# Patient Record
Sex: FEMALE | Race: WHITE | Employment: UNEMPLOYED | URBAN - METROPOLITAN AREA
[De-identification: names, ages, dates, MRNs, and addresses within clinical notes are randomized per-mention and may not be internally consistent; named-entity substitution may affect disease eponyms.]

---

## 2017-10-11 ENCOUNTER — HOSPITAL ENCOUNTER (OUTPATIENT)
Dept: LAB | Age: 57
Discharge: HOME | End: 2017-10-11
Payer: OTHER GOVERNMENT

## 2017-10-11 ENCOUNTER — OFFICE VISIT (OUTPATIENT)
Dept: INTERNAL MEDICINE CLINIC | Age: 57
End: 2017-10-11

## 2017-10-11 VITALS
SYSTOLIC BLOOD PRESSURE: 97 MMHG | TEMPERATURE: 97.4 F | HEIGHT: 62 IN | DIASTOLIC BLOOD PRESSURE: 67 MMHG | RESPIRATION RATE: 12 BRPM | BODY MASS INDEX: 25.17 KG/M2 | WEIGHT: 136.8 LBS | HEART RATE: 86 BPM | OXYGEN SATURATION: 95 %

## 2017-10-11 DIAGNOSIS — C50.912 MALIGNANT NEOPLASM OF LEFT FEMALE BREAST, UNSPECIFIED ESTROGEN RECEPTOR STATUS, UNSPECIFIED SITE OF BREAST (HCC): Primary | ICD-10-CM

## 2017-10-11 DIAGNOSIS — E03.9 ACQUIRED HYPOTHYROIDISM: ICD-10-CM

## 2017-10-11 DIAGNOSIS — M79.641 RIGHT HAND PAIN: ICD-10-CM

## 2017-10-11 DIAGNOSIS — Z11.59 NEED FOR HEPATITIS C SCREENING TEST: ICD-10-CM

## 2017-10-11 DIAGNOSIS — M25.551 RIGHT HIP PAIN: ICD-10-CM

## 2017-10-11 DIAGNOSIS — Z13.220 SCREENING FOR HYPERLIPIDEMIA: ICD-10-CM

## 2017-10-11 DIAGNOSIS — Z13.0 SCREENING FOR DEFICIENCY ANEMIA: ICD-10-CM

## 2017-10-11 DIAGNOSIS — C50.912 MALIGNANT NEOPLASM OF LEFT FEMALE BREAST, UNSPECIFIED ESTROGEN RECEPTOR STATUS, UNSPECIFIED SITE OF BREAST (HCC): ICD-10-CM

## 2017-10-11 DIAGNOSIS — Z12.4 ENCOUNTER FOR SCREENING FOR CERVICAL CANCER: ICD-10-CM

## 2017-10-11 PROBLEM — C50.919 MALIGNANT NEOPLASM OF FEMALE BREAST (HCC): Status: ACTIVE | Noted: 2017-10-11

## 2017-10-11 PROBLEM — K90.0 CELIAC DISEASE: Status: ACTIVE | Noted: 2017-10-11

## 2017-10-11 LAB
ALBUMIN SERPL-MCNC: 3.6 G/DL (ref 3.4–5)
ALBUMIN/GLOB SERPL: 1 {RATIO} (ref 0.8–1.7)
ALP SERPL-CCNC: 136 U/L (ref 45–117)
ALT SERPL-CCNC: 44 U/L (ref 13–56)
ANION GAP SERPL CALC-SCNC: 7 MMOL/L (ref 3–18)
AST SERPL-CCNC: 28 U/L (ref 15–37)
BASOPHILS # BLD: 0 K/UL (ref 0–0.1)
BASOPHILS NFR BLD: 1 % (ref 0–2)
BILIRUB SERPL-MCNC: 0.5 MG/DL (ref 0.2–1)
BUN SERPL-MCNC: 21 MG/DL (ref 7–18)
BUN/CREAT SERPL: 20 (ref 12–20)
CALCIUM SERPL-MCNC: 8.5 MG/DL (ref 8.5–10.1)
CHLORIDE SERPL-SCNC: 104 MMOL/L (ref 100–108)
CHOLEST SERPL-MCNC: 159 MG/DL
CO2 SERPL-SCNC: 29 MMOL/L (ref 21–32)
CREAT SERPL-MCNC: 1.07 MG/DL (ref 0.6–1.3)
DIFFERENTIAL METHOD BLD: ABNORMAL
EOSINOPHIL # BLD: 0.1 K/UL (ref 0–0.4)
EOSINOPHIL NFR BLD: 1 % (ref 0–5)
ERYTHROCYTE [DISTWIDTH] IN BLOOD BY AUTOMATED COUNT: 12.3 % (ref 11.6–14.5)
GLOBULIN SER CALC-MCNC: 3.5 G/DL (ref 2–4)
GLUCOSE SERPL-MCNC: 79 MG/DL (ref 74–99)
HCT VFR BLD AUTO: 37.4 % (ref 35–45)
HDLC SERPL-MCNC: 71 MG/DL (ref 40–60)
HDLC SERPL: 2.2 {RATIO} (ref 0–5)
HGB BLD-MCNC: 12.8 G/DL (ref 12–16)
LDLC SERPL CALC-MCNC: 43.8 MG/DL (ref 0–100)
LIPID PROFILE,FLP: ABNORMAL
LYMPHOCYTES # BLD: 1 K/UL (ref 0.9–3.6)
LYMPHOCYTES NFR BLD: 18 % (ref 21–52)
MCH RBC QN AUTO: 33 PG (ref 24–34)
MCHC RBC AUTO-ENTMCNC: 34.2 G/DL (ref 31–37)
MCV RBC AUTO: 96.4 FL (ref 74–97)
MONOCYTES # BLD: 0.3 K/UL (ref 0.05–1.2)
MONOCYTES NFR BLD: 6 % (ref 3–10)
NEUTS SEG # BLD: 4.1 K/UL (ref 1.8–8)
NEUTS SEG NFR BLD: 74 % (ref 40–73)
PLATELET # BLD AUTO: 242 K/UL (ref 135–420)
PMV BLD AUTO: 9.9 FL (ref 9.2–11.8)
POTASSIUM SERPL-SCNC: 4 MMOL/L (ref 3.5–5.5)
PROT SERPL-MCNC: 7.1 G/DL (ref 6.4–8.2)
RBC # BLD AUTO: 3.88 M/UL (ref 4.2–5.3)
SODIUM SERPL-SCNC: 140 MMOL/L (ref 136–145)
T4 FREE SERPL-MCNC: 1.2 NG/DL (ref 0.7–1.5)
TRIGL SERPL-MCNC: 221 MG/DL (ref ?–150)
TSH SERPL DL<=0.05 MIU/L-ACNC: 4.6 UIU/ML (ref 0.36–3.74)
VLDLC SERPL CALC-MCNC: 44.2 MG/DL
WBC # BLD AUTO: 5.6 K/UL (ref 4.6–13.2)

## 2017-10-11 PROCEDURE — 86803 HEPATITIS C AB TEST: CPT | Performed by: INTERNAL MEDICINE

## 2017-10-11 PROCEDURE — 84439 ASSAY OF FREE THYROXINE: CPT | Performed by: INTERNAL MEDICINE

## 2017-10-11 PROCEDURE — 85025 COMPLETE CBC W/AUTO DIFF WBC: CPT | Performed by: INTERNAL MEDICINE

## 2017-10-11 PROCEDURE — 36415 COLL VENOUS BLD VENIPUNCTURE: CPT | Performed by: INTERNAL MEDICINE

## 2017-10-11 PROCEDURE — 80053 COMPREHEN METABOLIC PANEL: CPT | Performed by: INTERNAL MEDICINE

## 2017-10-11 PROCEDURE — 80061 LIPID PANEL: CPT | Performed by: INTERNAL MEDICINE

## 2017-10-11 RX ORDER — LEVOTHYROXINE SODIUM 100 UG/1
100 TABLET ORAL
Qty: 90 TAB | Refills: 3 | Status: SHIPPED | OUTPATIENT
Start: 2017-10-11 | End: 2018-08-31 | Stop reason: SDUPTHER

## 2017-10-11 RX ORDER — GLUCOSAMINE SULFATE 1500 MG
2000 POWDER IN PACKET (EA) ORAL DAILY
COMMUNITY

## 2017-10-11 RX ORDER — GUAIFENESIN AND PHENYLEPHRINE HCL 400; 10 MG/1; MG/1
500 TABLET ORAL DAILY
COMMUNITY
End: 2018-06-05 | Stop reason: ALTCHOICE

## 2017-10-11 RX ORDER — IBUPROFEN 200 MG
800 TABLET ORAL
COMMUNITY
End: 2019-01-24

## 2017-10-11 RX ORDER — CHOLECALCIFEROL (VITAMIN D3) 125 MCG
5000 CAPSULE ORAL DAILY
COMMUNITY

## 2017-10-11 RX ORDER — MAGNESIUM 200 MG
400 TABLET ORAL DAILY
COMMUNITY

## 2017-10-11 RX ORDER — LEVOTHYROXINE SODIUM 100 UG/1
100 TABLET ORAL
COMMUNITY
Start: 2017-07-17 | End: 2017-10-11 | Stop reason: SDUPTHER

## 2017-10-11 RX ORDER — DEXTROMETHORPHAN HYDROBROMIDE, GUAIFENESIN 5; 100 MG/5ML; MG/5ML
650 LIQUID ORAL
COMMUNITY

## 2017-10-11 NOTE — PATIENT INSTRUCTIONS
Patient was given a copy of the Advanced Directive form and understands to bring it in once completed  Health Maintenance Due   Topic Date Due    Hepatitis C Screening  1960    Pneumococcal 19-64 Highest Risk (1 of 3 - PCV13) 12/20/1979    DTaP/Tdap/Td series (1 - Tdap) 12/20/1981    PAP AKA CERVICAL CYTOLOGY  12/20/1981    FOBT Q 1 YEAR AGE 50-75  12/20/2010    BREAST CANCER SCRN MAMMOGRAM  11/21/2014          Hypothyroidism: Care Instructions  Your Care Instructions  You have hypothyroidism, which means that your body is not making enough thyroid hormone. This hormone helps your body use energy. If your thyroid level is low, you may feel tired, be constipated, have an increase in your blood pressure, or have dry skin or memory problems. You may also get cold easily, even when it is warm. Women with low thyroid levels may have heavy menstrual periods. A blood test to find your thyroid-stimulating hormone (TSH) level is used to check for hypothyroidism. A high TSH level may mean that you have low thyroid. When your body is not making enough thyroid hormone, TSH levels rise in an effort to make the body produce more. The treatment for hypothyroidism is to take thyroid hormone pills. You should start to feel better in 1 to 2 weeks. But it can take several months to see changes in the TSH level. You will need regular visits with your doctor to make sure you have the right dose of medicine. Most people need treatment for the rest of their lives. You will need to see your doctor regularly to have blood tests and to make sure you are doing well. Follow-up care is a key part of your treatment and safety. Be sure to make and go to all appointments, and call your doctor if you are having problems. Its also a good idea to know your test results and keep a list of the medicines you take. How can you care for yourself at home? · Take your thyroid hormone medicine exactly as prescribed.  Call your doctor if you think you are having a problem with your medicine. Most people do not have side effects if they take the right amount of medicine regularly. ¨ Take the medicine 30 minutes before breakfast, and do not take it with calcium, vitamins, or iron. ¨ Do not take extra doses of your thyroid medicine. It will not help you get better any faster, and it may cause side effects. ¨ If you forget to take a dose, do NOT take a double dose of medicine. Take your usual dose the next day. · Tell your doctor about all prescription, herbal, or over-the-counter products you take. · Take care of yourself. Eat a healthy diet, get enough sleep, and get regular exercise. When should you call for help? Call 911 anytime you think you may need emergency care. For example, call if:  · You passed out (lost consciousness). · You have severe trouble breathing. · You have a very slow heartbeat (less than 60 beats a minute). · You have a low body temperature (95°F or below). Call your doctor now or seek immediate medical care if:  · You feel tired, sluggish, or weak. · You have trouble remembering things or concentrating. · You do not begin to feel better 2 weeks after starting your medicine. Watch closely for changes in your health, and be sure to contact your doctor if you have any problems. Where can you learn more? Go to http://shamir-majo.info/. Enter K390 in the search box to learn more about \"Hypothyroidism: Care Instructions. \"  Current as of: July 28, 2016  Content Version: 11.3  © 7247-5179 [employer]. Care instructions adapted under license by [email or web address] (which disclaims liability or warranty for this information). If you have questions about a medical condition or this instruction, always ask your healthcare professional. [address] any warranty or liability for your use of this information.

## 2017-10-11 NOTE — PROGRESS NOTES
[facility] OF [facility] [place]:  10/12/2017, MRN: [id number]      Taz Staples is a 64 y.o. female and presents to clinic to [facility]; Hypothyroidism; and Medication Refill (Printed for 90 days 3 refills)    Subjective: The pt is a 77-year-old female with history of left breast adenocarcinoma, celiac disease, and hypothyroidism. 1.  Left breast cancer: Diagnosed in 2012. S/p lumpectomy. S/p XRT. No chemotherapy. She took tamoxifen but had adverse side effects. This was d/c after 3 months. Her last mammogram was about a year ago. She reports no breast masses/pain. No nipple d/c.     2.  Hypothyroidism and Celiac Disease: On Synthroid 100 mcg per day. She was diagnosed with hypothyroidism 20yrs ago. Her Synthroid dose used to be 175mcg daily. It's been 1 year since her labs were checked. She was diagnosed with celiac disease per lab work yrs ago. She tries to adhere to a low carb gluten free diet. When she does not adhere to this diet, she has GI side effects including abdominal discomfort (generalized) and bloating. 3.  Health maintenance:  Last  Mammogram: 10/16 and it was unremarkable per pt hx  Last Pap smear: Overdue. No h/o abnormal PAPs per her hx  No excessive alcohol beverage consumption  - Tobacco use: None  - Drug use: None    4. Right Hip pain: She has \"arthritis\" in her right hip per imaging studies done within the past year. She has pain off/on in both hips but her right hip pain is worse than the left. She is taking tumeric as an antiinflammatory rx for symptomatic relief. Pain has been present for the past year but worse over the past 6 months. She will soak in epsom salt baths for symptomatic relief. 5. Left hand 3rd digit: Her 3rd digit feels \"locked\" in place. She attributes it to having trigger finger. Sx have been present for at least several months. No alleviating factors are known.          Patient Active Problem List    Diagnosis Date Noted    Malignant neoplasm of left female breast ([facility] - ductal adenocarcinoma 10/11/2017    Acquired hypothyroidism 10/11/2017    Celiac disease 10/11/2017       Current Outpatient Prescriptions   Medication Sig Dispense Refill    SYNTHROID 100 mcg tablet Take 1 Tab by mouth Daily (before breakfast). Indications: hypothyroidism 90 Tab 3    acetaminophen (TYLENOL ARTHRITIS PAIN) 650 mg CR tablet Take 650 mg by mouth every six (6) hours as needed for Pain.  ibuprofen (MOTRIN) 200 mg tablet Take 800 mg by mouth every six (6) hours as needed for Pain.  cholecalciferol (VITAMIN D3) 1,000 unit cap Take 1,000 Units by mouth daily.  biotin 5,000 mcg TbDi Take 5,000 mcg by mouth.  B.infantis-B.ani-B.long-B.bifi (PROBIOTIC 4X) 10-15 mg TbEC Take  by mouth daily.  magnesium 200 mg tab Take 200 mg by mouth daily.  turmeric root extract 500 mg cap Take 500 mg by mouth daily.          Allergies   Allergen Reactions    Pcn [Penicillins] Rash    Septra [Sulfamethoprim] Rash       Past Medical History:   Diagnosis Date    Arthritis     right hip    Cancer ([facility] 12/20/2012    Left Breast    Headache     Thyroid disease     hypothyroid       Past Surgical History:   Procedure Laterality Date    HX APPENDECTOMY  2016    HX BREAST LUMPECTOMY  2013 2013    HX CARPAL TUNNEL RELEASE  2014    right    HX CYST REMOVAL  1986    behind left knee    HX TYMPANOSTOMY  1986    bilateral    HX WISDOM TEETH EXTRACTION      x4       Family History   Problem Relation Age of Onset   24 [facility] Cancer Mother      colon,liver   [address] [facility] Hypertension Mother     Heart Disease Father     Heart Attack Father     No Known Problems Brother     No Known Problems Maternal Grandmother     No Known Problems Maternal Grandfather     No Known Problems Paternal Grandmother     No Known Problems Paternal Grandfather     No Known Problems Son     Diabetes Daughter     Thyroid Disease Daughter        Social History   Substance Use Topics    Smoking status: Never Smoker    Smokeless tobacco: Never Used    Alcohol use No       ROS   Review of Systems   Constitutional: Negative for chills and fever. HENT: Negative for ear pain and sore throat. Eyes: Negative for blurred vision and pain. Respiratory: Negative for cough and shortness of breath. Cardiovascular: Negative for chest pain. Gastrointestinal: Negative for abdominal pain, blood in stool and melena. Genitourinary: Negative for dysuria and hematuria. Musculoskeletal: Positive for joint pain. Negative for myalgias. Skin: Negative for rash. Neurological: Negative for tingling, focal weakness and headaches. Endo/Heme/Allergies: Does not bruise/bleed easily. Psychiatric/Behavioral: Negative for substance abuse. Objective     Vitals:    10/11/17 1449   BP: 97/67   Pulse: 86   Resp: 12   Temp: 97.4 °F (36.3 °C)   TempSrc: Oral   SpO2: 95%   Weight: 136 lb 12.8 oz (62.1 kg)   Height: 5' 2\" (1.575 m)   PainSc:   3   PainLoc: Hip       Physical Exam   Constitutional: She is oriented to person, place, and time and well-developed, well-nourished, and in no distress. HENT:   Head: Normocephalic and atraumatic. Right Ear: External ear normal.   Left Ear: External ear normal.   Nose: Nose normal.   Mouth/Throat: Oropharynx is clear and moist. No oropharyngeal exudate. Clear TMs   Eyes: Conjunctivae and EOM are normal. Pupils are equal, round, and reactive to light. Right eye exhibits no discharge. Left eye exhibits no discharge. No scleral icterus. Neck: Neck supple. Cardiovascular: Normal rate, regular rhythm, normal heart sounds and intact distal pulses. Exam reveals no gallop and no friction rub. No murmur heard. Pulmonary/Chest: Effort normal and breath sounds normal. No respiratory distress. She has no wheezes. She has no rales. Abdominal: Soft. Bowel sounds are normal. She exhibits no distension. There is no tenderness. There is no rebound and no guarding.    Musculoskeletal: She exhibits no edema or tenderness (BUE are NTTP but pt has increased muscle tone along her 3rd digit along her left hand). BLE are NTTP; no effusions are present. She has minimal pain with flexion/extension/abduction/adduction of b/l hip jts. Lymphadenopathy:     She has no cervical adenopathy. Neurological: She is alert and oriented to person, place, and time. She exhibits normal muscle tone. Gait normal.   Skin: Skin is warm and dry. No erythema. Psychiatric: Affect normal.   Nursing note and vitals reviewed. Assessment/Plan:   1. Malignant neoplasm of left female breast: S/p lumpectomy. - Checking a CBC, CMP, and TFTs. - Mammogram ordered to screen for breast cancer    ORDERS:  - TSH AND FREE T4; Future  - CBC WITH AUTOMATED DIFF; Future  - METABOLIC PANEL, COMPREHENSIVE; Future  - DEISI MAMMO BI DX INCL CAD; Future    2. Acquired hypothyroidism  Her Synthroid was refilled  Checking TFTs, a CBC, and a CMP. ORDERS:  - TSH AND FREE T4; Future  - CBC WITH AUTOMATED DIFF; Future  - METABOLIC PANEL, COMPREHENSIVE; Future    3. Health Maintenance:  Screening for hyperlipidemia with a lipid panel. Screening for deficiency anemia with a CBC. - Requesting previous physician records and her last vaccination records. Placing a referral for cervical cancer screening  Screening for hepatitis C per our discussion on CDC recommendations based on her date of birth. ORDERS:  - LIPID PANEL; Future  - CBC WITH AUTOMATED DIFF; Future  - REFERRAL TO GYNECOLOGY  - HEPATITIS C AB; Future    4. Left hand pain and Right Hip Pain: Trigger finger and Right hip OA respectively? Placing a referral to the orthopedic surgery team for evaluation  We discussed the importance of modifying her exercise/activities. Activity as tolerated. Can take over-the-counter Tylenol as needed.     ORDERS:  - REFERRAL TO ORTHOPEDIC SURGERY        Health Maintenance Due   Topic Date Due    Pneumococcal 19-64 Highest Risk (1 of 3 - PCV13) 12/20/1979    DTaP/Tdap/Td series (1 - Tdap) 12/20/1981    PAP AKA CERVICAL CYTOLOGY  12/20/1981    FOBT Q 1 YEAR AGE 50-75  12/20/2010    BREAST CANCER SCRN MAMMOGRAM  11/21/2014     Lab review: no labs are present for review. I have discussed the diagnosis with the patient and the intended plan as seen in the above orders. The patient has received an after-visit summary and questions were answered concerning future plans. I have discussed medication side effects and warnings with the patient as well. I have reviewed the plan of care with the patient, accepted their input and they are in agreement with the treatment goals. All questions were answered. The patient understands the plan of care. Handouts provided today with above information. Pt instructed if symptoms worsen to call the office or report to the ED for continued care. Greater than 50% of the visit time was spent in counseling and/or coordination of care. Follow-up Disposition:  Return in about 1 year (around 10/11/2018), or if symptoms worsen or fail to improve.     Daniel Cooley MD

## 2017-10-11 NOTE — ACP (ADVANCE CARE PLANNING)
Patient was given a copy of the Advanced Directive form and understands to bring it in once completed

## 2017-10-11 NOTE — MR AVS SNAPSHOT
Visit Information Date & Time Provider Department Dept. Phone Encounter #  
 10/11/2017  2:30 PM Maggi Edwards MD [facility] [address] [place] [address] Road [facility] [place] [id number] Upcoming Health Maintenance Date Due Hepatitis C Screening 1960 Pneumococcal 19-64 Highest Risk (1 of 3 - PCV13) 12/20/1979 DTaP/Tdap/Td series (1 - Tdap) 12/20/1981 PAP AKA CERVICAL CYTOLOGY 12/20/1981 FOBT Q 1 YEAR AGE 50-75 12/20/2010 BREAST CANCER SCRN MAMMOGRAM 11/21/2014 Allergies as of 10/11/2017  Review Complete On: 10/11/2017 By: Carlos Collins Severity Noted Reaction Type Reactions Pcn [Penicillins] High 10/11/2017    Rash Septra [Sulfamethoprim] High 10/11/2017    Rash Current Immunizations  Never Reviewed No immunizations on file. Not reviewed this visit You Were Diagnosed With   
  
 Codes Comments Malignant neoplasm of left female breast, unspecified estrogen receptor status, unspecified site of breast ([facility]    -  Primary ICD-10-CM: H01.905 ICD-9-CM: 174.9 Acquired hypothyroidism     ICD-10-CM: E03.9 ICD-9-CM: 244.9 Screening for hyperlipidemia     ICD-10-CM: Z13.220 ICD-9-CM: V77.91 Screening for deficiency anemia     ICD-10-CM: Z13.0 ICD-9-CM: V78.1 Right hand pain     ICD-10-CM: M79.641 ICD-9-CM: 729.5 Right hip pain     ICD-10-CM: M25.551 ICD-9-CM: 719.45 Encounter for screening for cervical cancer      ICD-10-CM: Z12.4 ICD-9-CM: V76.2 Need for hepatitis C screening test     ICD-10-CM: Z11.59 
ICD-9-CM: V73.89 Vitals BP Pulse Temp Resp Height(growth percentile) Weight(growth percentile) 97/67 (BP 1 Location: Left arm, BP Patient Position: Sitting) 86 97.4 °F (36.3 °C) (Oral) 12 5' 2\" (1.575 m) 136 lb 12.8 oz (62.1 kg) SpO2 BMI OB Status Smoking Status 95% 25.02 kg/m2 Menopause Never Smoker Vitals History BMI and BSA Data Body Mass Index Body Surface Area 25.02 kg/m 2 1.65 m 2 Your Updated Medication List  
  
   
This list is accurate as of: 10/11/17  3:55 PM.  Always use your most recent med list.  
  
  
  
  
 biotin 5,000 mcg Tbdi Take 5,000 mcg by mouth. ibuprofen 200 mg tablet Commonly known as:  MOTRIN Take 800 mg by mouth every six (6) hours as needed for Pain.  
  
 magnesium 200 mg Tab Take 200 mg by mouth daily. PROBIOTIC 4X 10-15 mg Tbec Generic drug:  B.infantis-B.ani-B.long-B.bifi Take  by mouth daily. SYNTHROID 100 mcg tablet Generic drug:  levothyroxine Take 1 Tab by mouth Daily (before breakfast). Indications: hypothyroidism  
  
 turmeric root extract 500 mg Cap Take 500 mg by mouth daily. TYLENOL ARTHRITIS PAIN 650 mg CR tablet Generic drug:  acetaminophen Take 650 mg by mouth every six (6) hours as needed for Pain. VITAMIN D3 1,000 unit Cap Generic drug:  cholecalciferol Take 1,000 Units by mouth daily. Prescriptions Printed Refills SYNTHROID 100 mcg tablet 3 Sig: Take 1 Tab by mouth Daily (before breakfast). Indications: hypothyroidism Class: Print Route: Oral  
  
We Performed the Following REFERRAL TO GYNECOLOGY [REF30 Custom] REFERRAL TO ORTHOPEDIC SURGERY [REF62 Custom] To-Do List   
 10/11/2017 Lab:  CBC WITH AUTOMATED DIFF   
  
 10/11/2017 Lab:  HEPATITIS C AB   
  
 10/11/2017 Lab:  LIPID PANEL   
  
 10/11/2017 Imaging:  DEISI MAMMO BI DX INCL CAD   
  
 10/11/2017 Lab:  METABOLIC PANEL, COMPREHENSIVE Around 10/11/2017 Lab:  TSH AND FREE T4 Referral Information Referral ID Referred By Referred To  
  
 [id number] Natasha Ramires Not Available Visits Status Start Date End Date 1 New Request 10/11/17 10/11/18 If your referral has a status of pending review or denied, additional information will be sent to support the outcome of this decision. Referral ID Referred By Referred To  
 [id number] Marissa Jones Not Available Visits Status Start Date End Date 1 New Request 10/11/17 10/11/18 If your referral has a status of pending review or denied, additional information will be sent to support the outcome of this decision. Patient Instructions Patient was given a copy of the Advanced Directive form and understands to bring it in once completed Health Maintenance Due Topic Date Due  
 Hepatitis C Screening  1960  Pneumococcal 19-64 Highest Risk (1 of 3 - PCV13) 12/20/1979  
 DTaP/Tdap/Td series (1 - Tdap) 12/20/1981  PAP AKA CERVICAL CYTOLOGY  12/20/1981  
 FOBT Q 1 YEAR AGE 50-75  12/20/2010  BREAST CANCER SCRN MAMMOGRAM  11/21/2014 Hypothyroidism: Care Instructions Your Care Instructions You have hypothyroidism, which means that your body is not making enough thyroid hormone. This hormone helps your body use energy. If your thyroid level is low, you may feel tired, be constipated, have an increase in your blood pressure, or have dry skin or memory problems. You may also get cold easily, even when it is warm. Women with low thyroid levels may have heavy menstrual periods. A blood test to find your thyroid-stimulating hormone (TSH) level is used to check for hypothyroidism. A high TSH level may mean that you have low thyroid. When your body is not making enough thyroid hormone, TSH levels rise in an effort to make the body produce more. The treatment for hypothyroidism is to take thyroid hormone pills. You should start to feel better in 1 to 2 weeks. But it can take several months to see changes in the TSH level. You will need regular visits with your doctor to make sure you have the right dose of medicine. Most people need treatment for the rest of their lives. You will need to see your doctor regularly to have blood tests and to make sure you are doing well. Follow-up care is a key part of your treatment and safety. Be sure to make and go to all appointments, and call your doctor if you are having problems. Its also a good idea to know your test results and keep a list of the medicines you take. How can you care for yourself at home? · Take your thyroid hormone medicine exactly as prescribed. Call your doctor if you think you are having a problem with your medicine. Most people do not have side effects if they take the right amount of medicine regularly. ¨ Take the medicine 30 minutes before breakfast, and do not take it with calcium, vitamins, or iron. ¨ Do not take extra doses of your thyroid medicine. It will not help you get better any faster, and it may cause side effects. ¨ If you forget to take a dose, do NOT take a double dose of medicine. Take your usual dose the next day. · Tell your doctor about all prescription, herbal, or over-the-counter products you take. · Take care of yourself. Eat a healthy diet, get enough sleep, and get regular exercise. When should you call for help? Call 911 anytime you think you may need emergency care. For example, call if: 
· You passed out (lost consciousness). · You have severe trouble breathing. · You have a very slow heartbeat (less than 60 beats a minute). · You have a low body temperature (95°F or below). Call your doctor now or seek immediate medical care if: 
· You feel tired, sluggish, or weak. · You have trouble remembering things or concentrating. · You do not begin to feel better 2 weeks after starting your medicine. Watch closely for changes in your health, and be sure to contact your doctor if you have any problems. Where can you learn more? Go to http://shamir-majo.info/. Enter O896 in the search box to learn more about \"Hypothyroidism: Care Instructions. \" Current as of: July 28, 2016 Content Version: 11.3 © 7616-9568 [employer]. Care instructions adapted under license by [employer] (which disclaims liability or warranty for this information). If you have questions about a medical condition or this instruction, always ask your healthcare professional. [address] any warranty or liability for your use of this information. Introducing [facility]! Sunil Louie introduces zLense patient portal. Now you can access parts of your medical record, email your doctor's office, and request medication refills online. 1. In your internet browser, go to [email or web address] 2. Click on the First Time User? Click Here link in the Sign In box. You will see the New Member Sign Up page. 3. Enter your zLense Access Code exactly as it appears below. You will not need to use this code after youve completed the sign-up process. If you do not sign up before the expiration date, you must request a new code. · zLense Access Code: [email or web address] Expires: 1/9/2018  3:55 PM 
 
4. Enter the last four digits of your Social Security Number (xxxx) and Date of Birth (mm/dd/yyyy) as indicated and click Submit. You will be taken to the next sign-up page. 5. Create a zLense ID. This will be your zLense login ID and cannot be changed, so think of one that is secure and easy to remember. 6. Create a zLense password. You can change your password at any time. 7. Enter your Password Reset Question and Answer. This can be used at a later time if you forget your password. 8. Enter your e-mail address. You will receive e-mail notification when new information is available in [address] 9. Click Sign Up. You can now view and download portions of your medical record. 10. Click the Download Summary menu link to download a portable copy of your medical information.  
 
If you have questions, please visit the Frequently Asked Questions section of the [employer]. Remember, [employer] is NOT to be used for urgent needs. For medical emergencies, dial 911. Now available from your iPhone and Android! Please provide this summary of care documentation to your next provider. Your primary care clinician is listed as Jennifer Baxter. If you have any questions after today's visit, please call [phone].

## 2017-10-12 LAB
HCV AB SER IA-ACNC: 0.09 INDEX
HCV AB SERPL QL IA: NEGATIVE
HCV COMMENT,HCGAC: NORMAL

## 2017-10-17 ENCOUNTER — HOSPITAL ENCOUNTER (OUTPATIENT)
Dept: MAMMOGRAPHY | Age: 57
Discharge: HOME | End: 2017-10-17
Attending: INTERNAL MEDICINE

## 2017-10-17 DIAGNOSIS — C50.912 MALIGNANT NEOPLASM OF LEFT FEMALE BREAST, UNSPECIFIED ESTROGEN RECEPTOR STATUS, UNSPECIFIED SITE OF BREAST (HCC): ICD-10-CM

## 2017-10-21 DIAGNOSIS — E03.9 ACQUIRED HYPOTHYROIDISM: Primary | ICD-10-CM

## 2017-10-22 NOTE — PROGRESS NOTES
Please let the pt know that she does not have hepatitis C infection. Kidney and liver function are normal. Her blood counts show no significant abnormalities. Her 10 year CVD risk per the ACC/AHA risk assessment is 0.8%. No medication is warranted to lower her CVD risk at this time. Meanwhile one of her thyroid labs is abnormal while the other is normal. We need to recheck her thyroid function in 1-2 wks. Please schedule her for a lab visit.      Dr. Samir Barnett  [facility] of [address]  Phone: [phone]  Fax: [phone]

## 2017-10-23 ENCOUNTER — TELEPHONE (OUTPATIENT)
Dept: INTERNAL MEDICINE CLINIC | Age: 57
End: 2017-10-23

## 2017-10-23 NOTE — TELEPHONE ENCOUNTER
----- Message from Foster Pierre MD sent at 10/21/2017  8:07 PM EDT -----  Please let the pt know that she does not have hepatitis C infection. Kidney and liver function are normal. Her blood counts show no significant abnormalities. Her 10 year CVD risk per the ACC/AHA risk assessment is 0.8%. No medication is warranted to lower her CVD risk at this time. Meanwhile one of her thyroid labs is abnormal while the other is normal. We need to recheck her thyroid function in 1-2 wks. Please schedule her for a lab visit.      Dr. Tamika Arango  [facility] [address]  Phone: [phone]  Fax: [phone]

## 2017-10-23 NOTE — TELEPHONE ENCOUNTER
Called and spoke to patient about below message. Patient verbalized understanding.   Scheduled lab appointment for October 30th

## 2017-10-31 ENCOUNTER — HOSPITAL ENCOUNTER (OUTPATIENT)
Dept: LAB | Age: 57
Discharge: HOME | End: 2017-10-31
Payer: OTHER GOVERNMENT

## 2017-10-31 DIAGNOSIS — E03.9 ACQUIRED HYPOTHYROIDISM: ICD-10-CM

## 2017-10-31 LAB
T4 FREE SERPL-MCNC: 1.3 NG/DL (ref 0.7–1.5)
TSH SERPL DL<=0.05 MIU/L-ACNC: 2 UIU/ML (ref 0.36–3.74)

## 2017-10-31 PROCEDURE — 84439 ASSAY OF FREE THYROXINE: CPT | Performed by: INTERNAL MEDICINE

## 2017-10-31 PROCEDURE — 36415 COLL VENOUS BLD VENIPUNCTURE: CPT | Performed by: INTERNAL MEDICINE

## 2017-11-02 ENCOUNTER — OFFICE VISIT (OUTPATIENT)
Dept: ORTHOPEDIC SURGERY | Age: 57
End: 2017-11-02

## 2017-11-02 VITALS
RESPIRATION RATE: 16 BRPM | DIASTOLIC BLOOD PRESSURE: 70 MMHG | OXYGEN SATURATION: 99 % | SYSTOLIC BLOOD PRESSURE: 105 MMHG | HEIGHT: 62 IN | HEART RATE: 81 BPM

## 2017-11-02 DIAGNOSIS — S73.191A TEAR OF RIGHT ACETABULAR LABRUM, INITIAL ENCOUNTER: ICD-10-CM

## 2017-11-02 DIAGNOSIS — M65.331 TRIGGER FINGER, RIGHT MIDDLE FINGER: ICD-10-CM

## 2017-11-02 DIAGNOSIS — M25.551 RIGHT HIP PAIN: ICD-10-CM

## 2017-11-02 DIAGNOSIS — M79.641 RIGHT HAND PAIN: ICD-10-CM

## 2017-11-02 DIAGNOSIS — M16.11 PRIMARY OSTEOARTHRITIS OF RIGHT HIP: Primary | ICD-10-CM

## 2017-11-02 RX ORDER — TRIAMCINOLONE ACETONIDE 40 MG/ML
20 INJECTION, SUSPENSION INTRA-ARTICULAR; INTRAMUSCULAR ONCE
Qty: 0.5 ML | Refills: 0
Start: 2017-11-02 | End: 2017-11-02

## 2017-11-02 RX ORDER — MELOXICAM 15 MG/1
15 TABLET ORAL
Qty: 30 TAB | Refills: 1 | Status: SHIPPED | OUTPATIENT
Start: 2017-11-02 | End: 2018-06-05 | Stop reason: SINTOL

## 2017-11-02 NOTE — PATIENT INSTRUCTIONS
Arthritis: Care Instructions  Your Care Instructions  Arthritis, also called osteoarthritis, is a breakdown of the cartilage that cushions your joints. When the cartilage wears down, your bones rub against each other. This causes pain and stiffness. Many people have some arthritis as they age. Arthritis most often affects the joints of the spine, hands, hips, knees, or feet. You can take simple measures to protect your joints, ease your pain, and help you stay active. Follow-up care is a key part of your treatment and safety. Be sure to make and go to all appointments, and call your doctor if you are having problems. It's also a good idea to know your test results and keep a list of the medicines you take. How can you care for yourself at home? · Stay at a healthy weight. Being overweight puts extra strain on your joints. · Talk to your doctor or physical therapist about exercises that will help ease joint pain. ¨ Stretch. You may enjoy gentle forms of yoga to help keep your joints and muscles flexible. ¨ Walk instead of jog. Other types of exercise that are less stressful on the joints include riding a bicycle, swimming, raul chi, or water exercise. ¨ Lift weights. Strong muscles help reduce stress on your joints. Stronger thigh muscles, for example, take some of the stress off of the knees and hips. Learn the right way to lift weights so you do not make joint pain worse. · Take your medicines exactly as prescribed. Call your doctor if you think you are having a problem with your medicine. · Take pain medicines exactly as directed. ¨ If the doctor gave you a prescription medicine for pain, take it as prescribed. ¨ If you are not taking a prescription pain medicine, ask your doctor if you can take an over-the-counter medicine. · Use a cane, crutch, walker, or another device if you need help to get around. These can help rest your joints.  You also can use other things to make life easier, such as a higher toilet seat and padded handles on kitchen utensils. · Do not sit in low chairs, which can make it hard to get up. · Put heat or cold on your sore joints as needed. Use whichever helps you most. You also can take turns with hot and cold packs. ¨ Apply heat 2 or 3 times a day for 20 to 30 minutes-using a heating pad, hot shower, or hot pack-to relieve pain and stiffness. ¨ Put ice or a cold pack on your sore joint for 10 to 20 minutes at a time. Put a thin cloth between the ice and your skin. When should you call for help? Call your doctor now or seek immediate medical care if:  ? · You have sudden swelling, warmth, or pain in any joint. ? · You have joint pain and a fever or rash. ? · You have such bad pain that you cannot use a joint. ? Watch closely for changes in your health, and be sure to contact your doctor if:  ? · You have mild joint symptoms that continue even with more than 6 weeks of care at home. ? · You have stomach pain or other problems with your medicine. Where can you learn more? Go to http://shamir-majo.info/. Enter O343 in the search box to learn more about \"Arthritis: Care Instructions. \"  Current as of: October 31, 2016  Content Version: 11.4  © 5649-4041 "[employer]". Care instructions adapted under license by [employer] (which disclaims liability or warranty for this information). If you have questions about a medical condition or this instruction, always ask your healthcare professional. [place] [address] any warranty or liability for your use of this information.

## 2017-11-02 NOTE — PROGRESS NOTES
Jennifer Pack  1960   Chief Complaint   Patient presents with    Hand Pain     right    Hip Pain     right        HISTORY OF PRESENT ILLNESS  Jennifer Pack is a 64 y.o. female who presents today for evaluation of right hand and right hip pain. Patient referred by Dr. Amarilys Solano for further evaluation. she rates her pain 8/10 today. Hip pain has been present for at least a year. Night pain when rolling onto the right hip. Describes groin pain, especially with rotation movements. Associated symptoms include locking of the hip and ambulating with a limp. Has tried soaking in Epson salts. Complaining of locking of the right long finger. Has tried following treatments: Injections:NO; Brace:NO; Therapy:NO; Cane/Crutch:NO       Allergies   Allergen Reactions    Pcn [Penicillins] Rash    Septra [Sulfamethoprim] Rash        Past Medical History:   Diagnosis Date    Arthritis     right hip    Cancer ([facility] [address] 12/20/2012    Left Breast    Headache     Thyroid disease     hypothyroid      Social History     Social History    Marital status:      Spouse name: N/A    Number of children: N/A    Years of education: N/A     Occupational History    Not on file.      Social History Main Topics    Smoking status: Never Smoker    Smokeless tobacco: Never Used    Alcohol use No    Drug use: Not on file    Sexual activity: Not on file     Other Topics Concern    Not on file     Social History Narrative      Past Surgical History:   Procedure Laterality Date    HX APPENDECTOMY  2016    HX BREAST LUMPECTOMY  2013 2013    HX CARPAL TUNNEL RELEASE  2014    right    HX CYST REMOVAL  1986    behind left knee    HX TYMPANOSTOMY  1986    bilateral    HX WISDOM TEETH EXTRACTION      x4      Family History   Problem Relation Age of Onset    Cancer Mother      colon,liver    Hypertension Mother     Heart Disease Father     Heart Attack Father     No Known Problems Brother     No Known Problems Maternal Grandmother     No Known Problems Maternal Grandfather     No Known Problems Paternal Grandmother     No Known Problems Paternal Grandfather     No Known Problems Son     Diabetes Daughter     Thyroid Disease Daughter       Current Outpatient Prescriptions   Medication Sig    triamcinolone acetonide (KENALOG) 40 mg/mL injection 0.5 mL by IntraMUSCular route once for 1 dose.  meloxicam (MOBIC) 15 mg tablet Take 1 Tab by mouth daily (with breakfast).  SYNTHROID 100 mcg tablet Take 1 Tab by mouth Daily (before breakfast). Indications: hypothyroidism    acetaminophen (TYLENOL ARTHRITIS PAIN) 650 mg CR tablet Take 650 mg by mouth every six (6) hours as needed for Pain.  ibuprofen (MOTRIN) 200 mg tablet Take 800 mg by mouth every six (6) hours as needed for Pain.  cholecalciferol (VITAMIN D3) 1,000 unit cap Take 1,000 Units by mouth daily.  biotin 5,000 mcg TbDi Take 5,000 mcg by mouth.  B.infantis-B.ani-B.long-B.bifi (PROBIOTIC 4X) 10-15 mg TbEC Take  by mouth daily.  magnesium 200 mg tab Take 200 mg by mouth daily.  turmeric root extract 500 mg cap Take 500 mg by mouth daily. No current facility-administered medications for this visit. REVIEW OF SYSTEM   Patient denies: Weight loss, Fever/Chills, HA, Visual changes, Fatigue, Chest pain, SOB, Abdominal pain, N/V/D/C, Blood in stool or urine, Edema. Pertinent positive as above in HPI. All others were negative    PHYSICAL EXAM:   Visit Vitals    /70    Pulse 81    Resp 16    Ht 5' 2\" (1.575 m)    SpO2 99%     The patient is a well-developed, well-nourished female   in no acute distress. The patient is alert and oriented times three. The patient is alert and oriented times three. Mood and affect are normal.  LYMPHATIC: lymph nodes are not enlarged and are within normal limits  SKIN: normal in color and non tender to palpation. There are no bruises or abrasions noted.    NEUROLOGICAL: Motor sensory exam is within normal limits. Reflexes are equal bilaterally. There is normal sensation to pinprick and light touch  MUSCULOSKELETAL:  Examination Right Hand   Skin Intact   Deformity -   Swelling -   Tenderness -   Tenderness A1 Pulley +long finger   Finger flexion Full   Finger extension Full   Thenar Eminence Atrophy -   Sensation Normal   Capillary refill -   Heberden's nodes -   Dupuytren's -     Examination Right Wrist   Skin Intact   Tenderness -   Flexion 60   Extension 60   Deformity -   Effusion -   Tinnel's sign -   Phalen's test -   Finklestein maneuver -   Pain with thumb abduction -     Examination Right hip   Skin Intact   Flexion ROM 80   Extension ROM 0   External Rotation ROM 30   Internal Rotation ROM 30   Abduction ROM 20   Adduction ROM 20   FADDIR +   RUKHSANA +   Log roll test -   Trochanteric tenderness -   Adia test -   Neurovascular Intact          PROCEDURE: After sterile prep, 0.5 cc of Xylocaine and 0.5 cc of Kenalog were injected into the right A1 pulley of the middle finger.        [facility]  OFFICE PROCEDURE PROGRESS NOTE        Chart reviewed for the following:  Erendira Lorenz MD, have reviewed the History, Physical and updated the Allergic reactions for [place]berg performed immediately prior to start of procedure:  Erendira Lorenz MD, have performed the following reviews on [address] prior to the start of the procedure:            * Patient was identified by name and date of birth   * Agreement on procedure being performed was verified  * Risks and Benefits explained to the patient  * Procedure site verified and marked as necessary  * Patient was positioned for comfort  * Consent was signed and verified     Time: 11:49 AM    Date of procedure: 11/2/2017    Procedure performed by:  Seble Valverde MD    Provider assisted by: (see medication administration)    How tolerated by patient: tolerated the procedure well with no complications    Comments: none      IMAGING:   XR of the right hand dated 11/2/17 was reviewed and read: normal    XR of the right hip dated 11/2/17 was reviewed and read: subchondral cyst of the superior dome of the acetabulum      IMPRESSION:      ICD-10-CM ICD-9-CM    1. Primary osteoarthritis of right hip M16.11 715.15 meloxicam (MOBIC) 15 mg tablet   2. Right hand pain M79.641 729.5 AMB POC XRAY, HAND; 3+ VIEWS   3. Right hip pain M25.551 719.45 AMB POC X-RAY RADEX HIP UNI WITH PELVIS 2-3 VIEWS   4. Tear of right acetabular labrum, initial encounter S73.191A 843.8 XR INJ HIP RT PRE CT/MRI ARTHRO      MRI HIP RT W CONT      meloxicam (MOBIC) 15 mg tablet   5. Trigger finger, right middle finger M65.331 727.03 TRIAMCINOLONE ACETONIDE INJ      triamcinolone acetonide (KENALOG) 40 mg/mL injection      IN DRAIN/INJECT SMALL JOINT/BURSA      meloxicam (MOBIC) 15 mg tablet        PLAN:  1. Patient experiencing right hip pain and triggering of the right middle finger. Risk factors include: n/a  2. Yes cortisone injection indicated today: RIGHT MIDDLE FINGER A1 PULLEY   3. No Physical/Occupational Therapy indicated today  4. Yes diagnostic test indicated today  5. No durable medical equipment indicated today  6. No referral indicated today   7. No medications indicated today  8. No Narcotic indicated today     RTC following completion of the MRI  Office note will be sent to the referring provider. Follow-up Disposition: Not on File    Scribed by Antonino Lee [facility] [address]) as dictated by Mya Peralta MD    I, Dr. Mya Peralta, confirm that all documentation is accurate.     Mya Peralta M.D.   [facility]

## 2017-11-08 NOTE — PROGRESS NOTES
Please let the pt know that her thyroid function is normal per her most recent labs. She should continue taking Synthroid as prescribed.     Dr. Slim Mason  [facility] [address]  Phone: [phone]  Fax: [phone]

## 2017-11-09 ENCOUNTER — TELEPHONE (OUTPATIENT)
Dept: INTERNAL MEDICINE CLINIC | Age: 57
End: 2017-11-09

## 2017-11-09 NOTE — TELEPHONE ENCOUNTER
Called and spoke to patient about the below message. Patient verbalized understanding with no additional questions.

## 2017-11-09 NOTE — TELEPHONE ENCOUNTER
----- Message from Domingo Segura MD sent at 11/8/2017  5:09 PM EST -----  Please let the pt know that her thyroid function is normal per her most recent labs. She should continue taking Synthroid as prescribed.     Dr. Flores Grate  [facility] [place] [address]  Phone: [phone]  Fax: [phone]

## 2017-11-20 ENCOUNTER — DOCUMENTATION ONLY (OUTPATIENT)
Dept: ORTHOPEDIC SURGERY | Age: 57
End: 2017-11-20

## 2017-11-27 ENCOUNTER — TELEPHONE (OUTPATIENT)
Dept: ORTHOPEDIC SURGERY | Age: 57
End: 2017-11-27

## 2017-11-27 NOTE — TELEPHONE ENCOUNTER
Pt requesting that her in office xrays be put on disk as the [facility] where she is having her MRI is requesting them. Please call pt when ready for pickup.

## 2017-12-01 NOTE — ACP (ADVANCE CARE PLANNING)
Advance Care Planning (ACP) Provider Conversation Snapshot    Date of ACP Conversation: 10/11/17  Persons included in Conversation:  patient  Length of ACP Conversation in minutes:  <16 minutes (Non-Billable)    Authorized Decision Maker (if patient is incapable of making informed decisions): This person is:   Healthcare Agent/Medical Power of  under Advance Directive     For Patients with Decision Making Capacity:   Values/Goals: Exploration of values, goals, and preferences if recovery is not expected, even with continued medical treatment in the event of:  Imminent death  Severe, permanent brain injury    Conversation Outcomes / Follow-Up Plan:   Recommended completion of Advance Directive form after review of ACP materials and conversation with prospective healthcare agent   Completed new Advance Directive. The patient completed her advanced directive and return to clinic. She has Harper Saint and Shannon Bettencourt as her power of  and success her power of . Her advanced directive document was scanned into the EHR.       Dr. Portia Alanis  [facility] [place] [address]  Phone: [phone]  Fax: [phone]

## 2017-12-07 ENCOUNTER — OFFICE VISIT (OUTPATIENT)
Dept: ORTHOPEDIC SURGERY | Age: 57
End: 2017-12-07

## 2017-12-07 VITALS — BODY MASS INDEX: 25.03 KG/M2 | HEIGHT: 62 IN | WEIGHT: 136 LBS

## 2017-12-07 DIAGNOSIS — M25.551 RIGHT HIP PAIN: ICD-10-CM

## 2017-12-07 DIAGNOSIS — M16.11 ARTHROPATHY OF RIGHT HIP: Primary | ICD-10-CM

## 2017-12-07 DIAGNOSIS — S73.191A TEAR OF RIGHT ACETABULAR LABRUM, INITIAL ENCOUNTER: ICD-10-CM

## 2017-12-07 RX ORDER — METHYLPREDNISOLONE 4 MG/1
TABLET ORAL
Qty: 21 DOSE PACK | Refills: 0 | Status: SHIPPED | OUTPATIENT
Start: 2017-12-07 | End: 2018-06-05 | Stop reason: ALTCHOICE

## 2017-12-07 RX ORDER — MELOXICAM 15 MG/1
15 TABLET ORAL
Qty: 30 TAB | Refills: 1 | Status: SHIPPED | OUTPATIENT
Start: 2017-12-07 | End: 2018-06-05 | Stop reason: SINTOL

## 2017-12-07 NOTE — PROGRESS NOTES
Nany Gomez  1960   Chief Complaint   Patient presents with    Hip Pain        HISTORY OF PRESENT ILLNESS  Nany Gomez is a 64 y.o. female who presents today for reevaluation of right hand and right hip pain and MRI review. Patient rates pain as 6/10 today. Her hip pain has been present for at least a year. At last OV, she had a cortisone injection in to her right middle finger. She has been taking extra strength tylenol and ibuprofen that have provided little relief. Patient denies any fever, chills, chest pain, shortness of breath or calf pain. There are no new illness or injuries to report since last seen in the office. There are no changes to medications, allergies, family or social history. PHYSICAL EXAM:   Visit Vitals    Ht 5' 2\" (1.575 m)    Wt 136 lb (61.7 kg)    BMI 24.87 kg/m2     The patient is a well-developed, well-nourished female   in no acute distress. The patient is alert and oriented times three. The patient is alert and oriented times three. Mood and affect are normal.  LYMPHATIC: lymph nodes are not enlarged and are within normal limits  SKIN: normal in color and non tender to palpation. There are no bruises or abrasions noted. NEUROLOGICAL: Motor sensory exam is within normal limits. Reflexes are equal bilaterally. There is normal sensation to pinprick and light touch  MUSCULOSKELETAL:        Examination Right hip   Skin Intact   Flexion ROM 80   Extension ROM 0   External Rotation ROM 30   Internal Rotation ROM 30   Abduction ROM 20   Adduction ROM 20   FADDIR +   RUKHSANA +   Log roll test -   Trochanteric tenderness -   Adia test -   Neurovascular Intact       IMAGING:   MRI of the right hip dated 11/30/17 was reviewed and read:   1. Constellation of findings including subchondral cysts without bony proliferation which favors inflammatory arthropathy. A referral to rheumatology for management is recommended if not already performed.   2. Bilateral, asymmetric sacroilitis which supports inflammatory arthropathy as most likely etiology. 3. Multidirectional labral tear. 4. Disc desiccation at multiple levels. XR of the right hand dated 11/2/17 was reviewed and read: normal     XR of the right hip dated 11/2/17 was reviewed and read: subchondral cyst of the superior dome of the acetabulum    IMPRESSION:      ICD-10-CM ICD-9-CM    1. Arthropathy of right hip M16.11 716.85    2. Right hip pain M25.551 719.45 meloxicam (MOBIC) 15 mg tablet      REFERRAL TO RHEUMATOLOGY      methylPREDNISolone (MEDROL DOSEPACK) 4 mg tablet   3. Tear of right acetabular labrum, initial encounter S73.191A 843.8         PLAN:   1. I discussed the results of the MRI and the treatment options with the patient. I feel that this patient has an inflammatory type of arthritis. I am sending her to see a rheumatologist right away. Risk factors include: n/a  2. No cortisone injection indicated today   3. No Physical/Occupational Therapy indicated today  4. No diagnostic test indicated today  5. No durable medical equipment indicated today  6. Yes referral indicated today RHEUMATOLOGY  7. Yes medications indicated today MOBIC, MDP  8. No Narcotic indicated today     RTC PRN  Follow-up Disposition: Not on File    Scribed by Ivory Juares [facility]) as dictated by Walter Mcrgath MD    I, Dr. Walter Mcgrath, confirm that all documentation is accurate.     Walter Mcgrath M.D.   [facility]

## 2017-12-11 ENCOUNTER — TELEPHONE (OUTPATIENT)
Dept: ORTHOPEDIC SURGERY | Age: 57
End: 2017-12-11

## 2017-12-11 NOTE — TELEPHONE ENCOUNTER
Dr. Robert Quintero office called over states that they need all labs, notes pertaining to why pt needs to see Rheumatologist and any images done. Their fax number is [phone].

## 2017-12-21 ENCOUNTER — HOSPITAL ENCOUNTER (OUTPATIENT)
Dept: LAB | Age: 57
Discharge: HOME | End: 2017-12-21
Payer: OTHER GOVERNMENT

## 2017-12-21 LAB
ALBUMIN SERPL-MCNC: 3.7 G/DL (ref 3.4–5)
ALBUMIN/GLOB SERPL: 1.2 {RATIO} (ref 0.8–1.7)
ALP SERPL-CCNC: 104 U/L (ref 45–117)
ALT SERPL-CCNC: 29 U/L (ref 13–56)
ANION GAP SERPL CALC-SCNC: 6 MMOL/L (ref 3–18)
APPEARANCE UR: CLEAR
AST SERPL-CCNC: 22 U/L (ref 15–37)
BACTERIA URNS QL MICRO: NEGATIVE /HPF
BASOPHILS # BLD: 0 K/UL (ref 0–0.1)
BASOPHILS NFR BLD: 0 % (ref 0–2)
BILIRUB SERPL-MCNC: 0.5 MG/DL (ref 0.2–1)
BILIRUB UR QL: NEGATIVE
BUN SERPL-MCNC: 14 MG/DL (ref 7–18)
BUN/CREAT SERPL: 19 (ref 12–20)
CALCIUM SERPL-MCNC: 8.6 MG/DL (ref 8.5–10.1)
CHLORIDE SERPL-SCNC: 105 MMOL/L (ref 100–108)
CK SERPL-CCNC: 100 U/L (ref 26–192)
CO2 SERPL-SCNC: 30 MMOL/L (ref 21–32)
COLOR UR: YELLOW
CREAT SERPL-MCNC: 0.73 MG/DL (ref 0.6–1.3)
CRP SERPL-MCNC: <0.3 MG/DL (ref 0–0.3)
DIFFERENTIAL METHOD BLD: ABNORMAL
EOSINOPHIL # BLD: 0 K/UL (ref 0–0.4)
EOSINOPHIL NFR BLD: 1 % (ref 0–5)
EPITH CASTS URNS QL MICRO: NORMAL /LPF (ref 0–5)
ERYTHROCYTE [DISTWIDTH] IN BLOOD BY AUTOMATED COUNT: 12.8 % (ref 11.6–14.5)
ERYTHROCYTE [SEDIMENTATION RATE] IN BLOOD: 11 MM/HR (ref 0–30)
GLOBULIN SER CALC-MCNC: 3.2 G/DL (ref 2–4)
GLUCOSE SERPL-MCNC: 86 MG/DL (ref 74–99)
GLUCOSE UR STRIP.AUTO-MCNC: NEGATIVE MG/DL
HCT VFR BLD AUTO: 41 % (ref 35–45)
HGB BLD-MCNC: 13.6 G/DL (ref 12–16)
HGB UR QL STRIP: NEGATIVE
KETONES UR QL STRIP.AUTO: NEGATIVE MG/DL
LEUKOCYTE ESTERASE UR QL STRIP.AUTO: ABNORMAL
LYMPHOCYTES # BLD: 0.7 K/UL (ref 0.9–3.6)
LYMPHOCYTES NFR BLD: 12 % (ref 21–52)
MCH RBC QN AUTO: 32.4 PG (ref 24–34)
MCHC RBC AUTO-ENTMCNC: 33.2 G/DL (ref 31–37)
MCV RBC AUTO: 97.6 FL (ref 74–97)
MONOCYTES # BLD: 0.4 K/UL (ref 0.05–1.2)
MONOCYTES NFR BLD: 6 % (ref 3–10)
NEUTS SEG # BLD: 4.9 K/UL (ref 1.8–8)
NEUTS SEG NFR BLD: 81 % (ref 40–73)
NITRITE UR QL STRIP.AUTO: NEGATIVE
PH UR STRIP: 8.5 [PH] (ref 5–8)
PLATELET # BLD AUTO: 236 K/UL (ref 135–420)
PMV BLD AUTO: 9.7 FL (ref 9.2–11.8)
POTASSIUM SERPL-SCNC: 3.9 MMOL/L (ref 3.5–5.5)
PROT SERPL-MCNC: 6.9 G/DL (ref 6.4–8.2)
PROT UR STRIP-MCNC: NEGATIVE MG/DL
RBC # BLD AUTO: 4.2 M/UL (ref 4.2–5.3)
RBC #/AREA URNS HPF: NEGATIVE /HPF (ref 0–5)
SODIUM SERPL-SCNC: 141 MMOL/L (ref 136–145)
SP GR UR REFRACTOMETRY: 1.01 (ref 1–1.03)
TSH SERPL DL<=0.05 MIU/L-ACNC: 1.91 UIU/ML (ref 0.36–3.74)
URATE SERPL-MCNC: 3.1 MG/DL (ref 2.6–7.2)
UROBILINOGEN UR QL STRIP.AUTO: 0.2 EU/DL (ref 0.2–1)
WBC # BLD AUTO: 6 K/UL (ref 4.6–13.2)
WBC URNS QL MICRO: NORMAL /HPF (ref 0–4)

## 2017-12-21 PROCEDURE — 81001 URINALYSIS AUTO W/SCOPE: CPT | Performed by: SPECIALIST

## 2017-12-21 PROCEDURE — 80074 ACUTE HEPATITIS PANEL: CPT | Performed by: SPECIALIST

## 2017-12-21 PROCEDURE — 86235 NUCLEAR ANTIGEN ANTIBODY: CPT | Performed by: SPECIALIST

## 2017-12-21 PROCEDURE — 86140 C-REACTIVE PROTEIN: CPT | Performed by: SPECIALIST

## 2017-12-21 PROCEDURE — 85025 COMPLETE CBC W/AUTO DIFF WBC: CPT | Performed by: SPECIALIST

## 2017-12-21 PROCEDURE — 86480 TB TEST CELL IMMUN MEASURE: CPT | Performed by: SPECIALIST

## 2017-12-21 PROCEDURE — 86200 CCP ANTIBODY: CPT | Performed by: SPECIALIST

## 2017-12-21 PROCEDURE — 83520 IMMUNOASSAY QUANT NOS NONAB: CPT | Performed by: SPECIALIST

## 2017-12-21 PROCEDURE — 36415 COLL VENOUS BLD VENIPUNCTURE: CPT | Performed by: SPECIALIST

## 2017-12-21 PROCEDURE — 85652 RBC SED RATE AUTOMATED: CPT | Performed by: SPECIALIST

## 2017-12-21 PROCEDURE — 82550 ASSAY OF CK (CPK): CPT | Performed by: SPECIALIST

## 2017-12-21 PROCEDURE — 84443 ASSAY THYROID STIM HORMONE: CPT | Performed by: SPECIALIST

## 2017-12-21 PROCEDURE — 80053 COMPREHEN METABOLIC PANEL: CPT | Performed by: SPECIALIST

## 2017-12-21 PROCEDURE — 84550 ASSAY OF BLOOD/URIC ACID: CPT | Performed by: SPECIALIST

## 2017-12-21 PROCEDURE — 86038 ANTINUCLEAR ANTIBODIES: CPT | Performed by: SPECIALIST

## 2017-12-22 LAB
ANA TITR SER IF: NEGATIVE {TITER}
CCP IGA+IGG SERPL IA-ACNC: 2 UNITS (ref 0–19)
ENA RNP AB SER-ACNC: 0.2 AI (ref 0–0.9)
ENA SM AB SER-ACNC: <0.2 AI (ref 0–0.9)
HAV IGM SER QL: NEGATIVE
HBV CORE IGM SER QL: NEGATIVE
HBV SURFACE AG SER QL: <0.1 INDEX
HBV SURFACE AG SER QL: NEGATIVE
HCV AB SER IA-ACNC: 0.02 INDEX
HCV AB SERPL QL IA: NEGATIVE
HCV COMMENT,HCGAC: NORMAL
SP1: NORMAL
SP2: NORMAL
SP3: NORMAL

## 2017-12-26 LAB
ANNOTATION COMMENT IMP: NORMAL
C-ANCA TITR SER IF: NORMAL TITER
M TB IFN-G CD4+ BCKGRND COR BLD-ACNC: 0 IU/ML
M TB IFN-G CD4+ T-CELLS BLD-ACNC: 0.06 IU/ML
M TB TUBERC IFN-G BLD QL: NEGATIVE
M TB TUBERC IGNF/MITOGEN IGNF CONTROL: >10 IU/ML
MYELOPEROXIDASE AB SER IA-ACNC: <9 U/ML (ref 0–9)
P-ANCA ATYPICAL TITR SER IF: NORMAL TITER
P-ANCA TITR SER IF: NORMAL TITER
PROTEINASE3 AB SER IA-ACNC: <3.5 U/ML (ref 0–3.5)
QUANTIFERON NIL VALUE: 0.06 IU/ML
SERVICE CMNT-IMP: NORMAL

## 2018-01-24 ENCOUNTER — OFFICE VISIT (OUTPATIENT)
Dept: ORTHOPEDIC SURGERY | Age: 58
End: 2018-01-24

## 2018-01-24 VITALS
SYSTOLIC BLOOD PRESSURE: 104 MMHG | OXYGEN SATURATION: 100 % | WEIGHT: 137 LBS | TEMPERATURE: 96.8 F | DIASTOLIC BLOOD PRESSURE: 65 MMHG | BODY MASS INDEX: 25.21 KG/M2 | HEART RATE: 75 BPM | HEIGHT: 62 IN

## 2018-01-24 DIAGNOSIS — S73.191A TEAR OF RIGHT ACETABULAR LABRUM, INITIAL ENCOUNTER: ICD-10-CM

## 2018-01-24 DIAGNOSIS — M16.11 PRIMARY OSTEOARTHRITIS OF RIGHT HIP: ICD-10-CM

## 2018-01-24 DIAGNOSIS — M16.11 ARTHROPATHY OF RIGHT HIP: Primary | ICD-10-CM

## 2018-01-24 NOTE — PROGRESS NOTES
Konstantin Rocah  1960   Chief Complaint   Patient presents with    Follow-up     right hip        HISTORY OF PRESENT ILLNESS  Konstantin Rocha is a 62 y.o. female who presents today for reevaluation of right hand and right hip pain and MRI review. Patient rates pain as 0/10 today. Patient is pleased with her decreased pain while taking Mobic. She continues to have occasional pain with prolonged walking and standing. In general, it does not affect her ADLs. She went to rheumatology and it all tests came back negative. Rheumatology gave her Mobic 7.5 which she takes every other day. Patient denies any fever, chills, chest pain, shortness of breath or calf pain. There are no new illness or injuries to report since last seen in the office. There are no changes to medications, allergies, family or social history. PHYSICAL EXAM:   Visit Vitals    /65    Pulse 75    Temp 96.8 °F (36 °C) (Oral)    Ht 5' 2\" (1.575 m)    Wt 137 lb (62.1 kg)    SpO2 100%    BMI 25.06 kg/m2     The patient is a well-developed, well-nourished female   in no acute distress. The patient is alert and oriented times three. The patient is alert and oriented times three. Mood and affect are normal.  LYMPHATIC: lymph nodes are not enlarged and are within normal limits  SKIN: normal in color and non tender to palpation. There are no bruises or abrasions noted. NEUROLOGICAL: Motor sensory exam is within normal limits. Reflexes are equal bilaterally. There is normal sensation to pinprick and light touch  MUSCULOSKELETAL:   Examination Right hip   Skin Intact   Flexion ROM 80   Extension ROM 0   External Rotation ROM 30   Internal Rotation ROM 30   Abduction ROM 20   Adduction ROM 20   FADDIR +   RUKHSANA +   Log roll test -   Trochanteric tenderness -   Adia test -   Neurovascular Intact       IMAGING:   MRI of the right hip dated 11/30/17 was reviewed and read:   1.  Constellation of findings including subchondral cysts without bony proliferation which favors inflammatory arthropathy. A referral to rheumatology for management is recommended if not already performed. 2. Bilateral, asymmetric sacroilitis which supports inflammatory arthropathy as most likely etiology. 3. Multidirectional labral tear. 4. Disc desiccation at multiple levels. XR of the right hand dated 11/2/17 was reviewed and read: normal     XR of the right hip dated 11/2/17 was reviewed and read: subchondral cyst of the superior dome of the acetabulum    IMPRESSION:      ICD-10-CM ICD-9-CM    1. Arthropathy of right hip M16.11 716.85    2. Tear of right acetabular labrum, initial encounter S73.191A 843.8    3. Primary osteoarthritis of right hip M16.11 715.15         PLAN:   1. The patient's right hip pain has improved with the use of Mobic. I am hopeful that we can continue to manage her symptoms this way. Discussed future hip replacement. Risk factors include: n/a  2. No cortisone injection indicated today   3. No Physical/Occupational Therapy indicated today  4. No diagnostic test indicated today  5. No durable medical equipment indicated today  6. No referral indicated today   7. No medications indicated today   8.  No Narcotic indicated today     RTC prn  Follow-up Disposition: Not on File    Scribed by Per [address]) as dictated by ZACHERY [facility]on [address] [facility]ist

## 2018-02-12 ENCOUNTER — TELEPHONE (OUTPATIENT)
Dept: ORTHOPEDIC SURGERY | Age: 58
End: 2018-02-12

## 2018-02-12 NOTE — TELEPHONE ENCOUNTER
Pt requesting a copy of her xray done last month at [facility]. Please call pt when it's ready.   Pt F#[id number]

## 2018-02-12 NOTE — TELEPHONE ENCOUNTER
Patient is calling to check the status of this request. Patient needs for an appt for tomorrow.   Patient can be reached at [phone]

## 2018-02-13 NOTE — TELEPHONE ENCOUNTER
PATIENT CALLED AGAIN AND SAID SHE NEEDS A COPY OF THE XRAY THAT WAS TAKEN ON HER RT HIP BY Jack Alvarez NURSE LAST MONTH IN January. PATIENT SAID SHE NEEDS IT AS SOON AS POSSIBLE TODAY DUE TO SHE HAS AN APPOINTMENT TO GO TO AND IS REQUIRED TO TAKE THE COPY OF THE XRAY. PATIENT SAID SHE NEEDS TO  THE COPY OF THE XRAY ON [facility] [address] TODAY IN THE NEXT 15 MINS. PATIENT WILL  AT [address] [place] [address].. PATIENT TEL. [phone].

## 2018-02-13 NOTE — TELEPHONE ENCOUNTER
Just spoke to pt. She is going to [facility] office to  x-ray results. Pt is requesting a disc but was not contacted to come pick it up yet. I told her the results will be in the office note if the disc is not ready. Pt does not have release form on file.

## 2018-03-09 ENCOUNTER — TELEPHONE (OUTPATIENT)
Dept: INTERNAL MEDICINE CLINIC | Age: 58
End: 2018-03-09

## 2018-03-09 NOTE — TELEPHONE ENCOUNTER
Please call patient and let her know we have made 3 requests to [facility] to retrieve her last two years of records to no avail. We need her immunization history, last pap, last office notes, and colonoscopy report (if she has had one). She may have access to her records through a MY CHART type account. If not, perhaps she can request written copies of these to give to Dr Christiane Rhoades.

## 2018-03-12 NOTE — TELEPHONE ENCOUNTER
Pt to try and get records from [facility]. She says we [facility]. I called them and they are faxing last note today.

## 2018-05-31 ENCOUNTER — TELEPHONE (OUTPATIENT)
Dept: INTERNAL MEDICINE CLINIC | Age: 58
End: 2018-05-31

## 2018-05-31 DIAGNOSIS — B02.9 HERPES ZOSTER WITHOUT COMPLICATION: Primary | ICD-10-CM

## 2018-05-31 RX ORDER — VALACYCLOVIR HYDROCHLORIDE 1 G/1
1000 TABLET, FILM COATED ORAL 3 TIMES DAILY
Qty: 21 TAB | Refills: 0 | Status: SHIPPED | OUTPATIENT
Start: 2018-05-31 | End: 2018-06-07

## 2018-05-31 NOTE — TELEPHONE ENCOUNTER
Pt calling asking to speak to Dr. Alexander Minaya or nurse. Says she got her second case of shingles. Wants to know how contagious she is? If she is in same room as grandchild who is 5 months old can the baby get the chickenpox?

## 2018-05-31 NOTE — TELEPHONE ENCOUNTER
Chief Complaint   Patient presents with    Illness     re: Shingles     Patient reached and informed per Dr Kilo Perez she is contagious and should not be around grandchildren. The patient already has a follow up appointment with Dr Kilo Perez next week, and states she started the antibiotic Tuesday a few days ago.

## 2018-06-05 ENCOUNTER — OFFICE VISIT (OUTPATIENT)
Dept: INTERNAL MEDICINE CLINIC | Age: 58
End: 2018-06-05

## 2018-06-05 VITALS
WEIGHT: 136.2 LBS | DIASTOLIC BLOOD PRESSURE: 68 MMHG | HEIGHT: 62 IN | HEART RATE: 71 BPM | RESPIRATION RATE: 12 BRPM | OXYGEN SATURATION: 96 % | TEMPERATURE: 97.8 F | SYSTOLIC BLOOD PRESSURE: 101 MMHG | BODY MASS INDEX: 25.06 KG/M2

## 2018-06-05 DIAGNOSIS — K90.0 CELIAC DISEASE: ICD-10-CM

## 2018-06-05 DIAGNOSIS — Z13.220 SCREENING FOR HYPERLIPIDEMIA: ICD-10-CM

## 2018-06-05 DIAGNOSIS — C50.912 MALIGNANT NEOPLASM OF LEFT FEMALE BREAST, UNSPECIFIED ESTROGEN RECEPTOR STATUS, UNSPECIFIED SITE OF BREAST (HCC): ICD-10-CM

## 2018-06-05 DIAGNOSIS — E03.9 ACQUIRED HYPOTHYROIDISM: ICD-10-CM

## 2018-06-05 DIAGNOSIS — B02.9 HERPES ZOSTER WITHOUT COMPLICATION: Primary | ICD-10-CM

## 2018-06-05 RX ORDER — HYDROXYZINE 25 MG/1
TABLET, FILM COATED ORAL
COMMUNITY
Start: 2018-05-29 | End: 2018-06-05

## 2018-06-05 RX ORDER — CYANOCOBALAMIN (VITAMIN B-12) 1000 MCG
200 TABLET, EXTENDED RELEASE ORAL DAILY
COMMUNITY
End: 2018-08-31 | Stop reason: ALTCHOICE

## 2018-06-05 NOTE — PROGRESS NOTES
Chief Complaint   Patient presents with   [address] on 5-29-18 for Shingles     1. Have you been to the ER, urgent care clinic since your last visit? Hospitalized since your last visit? Yes When: 5-29-18 Where: [facility] Reason: Shingles    2. Have you seen or consulted any other health care providers outside of the [facility] since your last visit? Include any pap smears or colon screening.  No

## 2018-06-05 NOTE — MR AVS SNAPSHOT
[address] [facility], [place] 
 
 
 [address] [facility], [address] [place] [address] [facility] 
[phone] Patient: Katy Calixto 
MRN: [id number] Visit Information Date & Time Provider Department Dept. Phone Encounter #  
 6/5/2018  9:00 AM Anna Strickland MD [id number] Follow-up Instructions Return in about 6 months (around 12/5/2018) for hypothyroidism, celiac disease, flu vaccine, pneumococcal vaccine. Your Appointments 12/5/2018 10:00 AM  
Office Visit with Anna Strickland MD  
[facility] [facility]) Appt Note: ov 6mos. TELECARE [facility] [address], [address] [place] [address] [address] [facility]  
  
   
 [address], [address] Upcoming Health Maintenance Date Due Pneumococcal 19-64 Highest Risk (1 of 3 - PCV13) 12/20/1979 DTaP/Tdap/Td series (1 - Tdap) 12/20/1981 FOBT Q 1 YEAR AGE 50-75 12/20/2010 Influenza Age 5 to Adult 8/1/2018 BREAST CANCER SCRN MAMMOGRAM 1/30/2020 PAP AKA CERVICAL CYTOLOGY 11/14/2020 Allergies as of 6/5/2018  Review Complete On: 6/5/2018 By: Anna Strickland MD  
  
 Severity Noted Reaction Type Reactions Mobic [Meloxicam] High 06/05/2018    Other (comments) Bloating and severe constipation Pcn [Penicillins] High 10/11/2017    Rash Penicillin G High   Rash Septra [Sulfamethoprim] High 10/11/2017    Rash  
 Sulfa (Sulfonamide Antibiotics) High   Other (comments) Current Immunizations  Never Reviewed No immunizations on file. Not reviewed this visit You Were Diagnosed With   
  
 Codes Comments Celiac disease    -  Primary ICD-10-CM: K90.0 ICD-9-CM: 579.0 Malignant neoplasm of left female breast, unspecified estrogen receptor status, unspecified site of [facility]     ICD-10-CM: W29.934 ICD-9-CM: 174.9 Acquired hypothyroidism     ICD-10-CM: E03.9 ICD-9-CM: 244.9 Herpes zoster without complication     YYE-33-BY: B02.9 ICD-9-CM: 479. 9 Vitals BP Pulse Temp Resp Height(growth percentile) Weight(growth percentile) 101/68 (BP 1 Location: Left arm, BP Patient Position: Sitting) 71 97.8 °F (36.6 °C) (Oral) 12 5' 2\" (1.575 m) 136 lb 3.2 oz (61.8 kg) SpO2 BMI OB Status Smoking Status 96% 24.91 kg/m2 Menopause Never Smoker BMI and BSA Data Body Mass Index Body Surface Area 24.91 kg/m 2 1.64 m 2 Preferred Pharmacy Pharmacy Name Phone [address] [facility] [address], [facility] [address] [facility] [address] [phone] Your Updated Medication List  
  
   
This list is accurate as of 6/5/18  9:43 AM.  Always use your most recent med list.  
  
  
  
  
 biotin 5,000 mcg Tbdi Take 5,000 mcg by mouth daily. ibuprofen 200 mg tablet Commonly known as:  MOTRIN Take 800 mg by mouth every six (6) hours as needed for Pain.  
  
 magnesium 200 mg Tab Take 400 mg by mouth daily. potassium 99 mg tablet Take 99 mg by mouth daily. PROBIOTIC 4X 10-15 mg Tbec Generic drug:  B.infantis-B.ani-B.long-B.bifi Take  by mouth daily. selenium 200 mcg Cap Take 200 mg by mouth daily. SYNTHROID 100 mcg tablet Generic drug:  levothyroxine Take 1 Tab by mouth Daily (before breakfast). Indications: hypothyroidism TYLENOL ARTHRITIS PAIN 650 mg Randolph William Generic drug:  acetaminophen Take 650 mg by mouth every six (6) hours as needed for Pain. valACYclovir 1 gram tablet Commonly known as:  VALTREX Take 1 Tab by mouth three (3) times daily for 7 days. VITAMIN D3 1,000 unit Cap Generic drug:  cholecalciferol Take 1,000 Units by mouth daily. Follow-up Instructions Return in about 6 months (around 12/5/2018) for hypothyroidism, celiac disease, flu vaccine, pneumococcal vaccine. Patient Instructions Health Maintenance Due Topic Date Due  
  Pneumococcal 19-64 Highest Risk (1 of 3 - PCV13) 12/20/1979  
 DTaP/Tdap/Td series (1 - Tdap) 12/20/1981  
 FOBT Q 1 YEAR AGE 50-75  12/20/2010 Shingles: Care Instructions Your Care Instructions Shingles (herpes zoster) causes pain and a blistered rash. The rash can appear anywhere on the body but will be on only one side of the body, the left or right. It will be in a band, a strip, or a small area. The pain can be very severe. Shingles can also cause tingling or itching in the area of the rash. The blisters scab over after a few days and heal in 2 to 4 weeks. Medicines can help you feel better and may help prevent more serious problems caused by shingles. Shingles is caused by the same virus that causes chickenpox. When you have chickenpox, the virus gets into your nerve roots and stays there (becomes dormant) long after you get over the chickenpox. If the virus becomes active again, it can cause shingles. Follow-up care is a key part of your treatment and safety. Be sure to make and go to all appointments, and call your doctor if you are having problems. It's also a good idea to know your test results and keep a list of the medicines you take. How can you care for yourself at home? · Be safe with medicines. Take your medicines exactly as prescribed. Call your doctor if you think you are having a problem with your medicine. Antiviral medicine helps you get better faster. · Try not to scratch or pick at the blisters. They will crust over and fall off on their own if you leave them alone. · Put cool, wet cloths on the area to relieve pain and itching. You can also use calamine lotion. Try not to use so much lotion that it cakes and is hard to get off. · Put cornstarch or baking soda on the sores to help dry them out so they heal faster. · Do not use thick ointment, such as petroleum jelly, on the sores. This will keep them from drying and healing. · To help remove loose crusts, soak them in tap water. This can help decrease oozing, and dry and soothe the skin. · Take an over-the-counter pain medicine, such as acetaminophen (Tylenol), ibuprofen (Advil, Motrin), or naproxen (Aleve). Read and follow all instructions on the label. · Avoid close contact with people until the blisters have healed. It is very important for you to avoid contact with anyone who has never had chickenpox or the chickenpox vaccine. Pregnant women, young babies, and anyone else who has a hard time fighting infection (such as someone with HIV, diabetes, or cancer) is especially at risk. When should you call for help? Call your doctor now or seek immediate medical care if: 
? · You have a new or higher fever. ? · You have a severe headache and a stiff neck. ? · You lose the ability to think clearly. ? · The rash spreads to your forehead, nose, eyes, or eyelids. ? · You have eye pain, or your vision gets worse. ? · You have new pain in your face, or you cannot move the muscles in your face. ? · Blisters spread to new parts of your body. ? Watch closely for changes in your health, and be sure to contact your doctor if: 
? · The rash has not healed after 2 to 4 weeks. ? · You still have pain after the rash has healed. Where can you learn more? Go to http://shamir-majo.info/. Juan Manuel Guido in the search box to learn more about \"Shingles: Care Instructions. \" Current as of: March 3, 2017 Content Version: 11.4 © 9622-3146 [employer]. Care instructions adapted under license by [employer] (which disclaims liability or warranty for this information). If you have questions about a medical condition or this instruction, always ask your healthcare professional. [address] any warranty or liability for your use of this information. Introducing [facility]! Yulisa Hernandez introduces [employer] patient portal. Now you can access parts of your medical record, email your doctor's office, and request medication refills online. 1. In your internet browser, go to [email or web address] 2. Click on the First Time User? Click Here link in the Sign In box. You will see the New Member Sign Up page. 3. Enter your [employer] Access Code exactly as it appears below. You will not need to use this code after youve completed the sign-up process. If you do not sign up before the expiration date, you must request a new code. · [employer] Access Code: [email or web address] Expires: 9/3/2018  8:58 AM 
 
4. Enter the last four digits of your Social Security Number (xxxx) and Date of Birth (mm/dd/yyyy) as indicated and click Submit. You will be taken to the next sign-up page. 5. Create a Optinuity ID. This will be your [employer]inuity login ID and cannot be changed, so think of one that is secure and easy to remember. 6. Create a [employer] password. You can change your password at any time. 7. Enter your Password Reset Question and Answer. This can be used at a later time if you forget your password. 8. Enter your e-mail address. You will receive e-mail notification when new information is available in [address] 9. Click Sign Up. You can now view and download portions of your medical record. 10. Click the Download Summary menu link to download a portable copy of your medical information. If you have questions, please visit the Frequently Asked Questions section of the [employer] website. Remember, [employer] is NOT to be used for urgent needs. For medical emergencies, dial 911. Now available from your iPhone and Android! Please provide this summary of care documentation to your next provider. Your primary care clinician is listed as Lucas Gonzales. If you have any questions after today's visit, please call [phone].

## 2018-06-05 NOTE — PROGRESS NOTES
[facility]:  6/5/2018, MRN: [id number]      Henok Ratliff is a 62 y.o. female and presents to clinic for Hospital Follow Up [facility] on 5-29-18 for Shingles)    Subjective: The pt is a 68-year-old female with history of left breast adenocarcinoma (s/p lumpectomy and XRT in 2012), celiac disease, and hypothyroidism. 1. Shingles: Present since 5/29/18 along her left inner thigh. She was seen in the ED and given a rx for valtrex. She has no adverse side effects from the valtrex. She has no pain along the affects area today. The area initially blistered up but is now it is \"crusting up\" per her hx. The rash is associated with pruritus. No fever/chills. 2. Left Breast Cancer: S/p lumpectomy. Diagnosed in 2012. S/p XRT. She did not receive chemotherapy. She was unable to tolerate tamoxifen. No breast pain/masses. [facility]. 3. Hypothyroidism: On Synthroid 100mcg daily. She has had hypothyroidism >20 yrs. 4. Health Maintenance:  - She had a colonoscopy 5 yrs ago. No hematochezia or melena. Patient Active Problem List    Diagnosis Date Noted    Malignant neoplasm of left female breast ([facility] - ductal adenocarcinoma 10/11/2017    Acquired hypothyroidism 10/11/2017    Celiac disease 10/11/2017       Current Outpatient Prescriptions   Medication Sig Dispense Refill    selenium 200 mcg cap Take 200 mg by mouth daily.  potassium 99 mg tablet Take 99 mg by mouth daily.  valACYclovir (VALTREX) 1 gram tablet Take 1 Tab by mouth three (3) times daily for 7 days. 21 Tab 0    SYNTHROID 100 mcg tablet Take 1 Tab by mouth Daily (before breakfast). Indications: hypothyroidism 90 Tab 3    acetaminophen (TYLENOL ARTHRITIS PAIN) 650 mg CR tablet Take 650 mg by mouth every six (6) hours as needed for Pain.  ibuprofen (MOTRIN) 200 mg tablet Take 800 mg by mouth every six (6) hours as needed for Pain.       cholecalciferol (VITAMIN D3) 1,000 unit cap Take 1,000 Units by mouth daily.  biotin 5,000 mcg TbDi Take 5,000 mcg by mouth daily.  magnesium 200 mg tab Take 400 mg by mouth daily.  B.infantis-B.ani-B.long-B.bifi (PROBIOTIC 4X) 10-15 mg TbEC Take  by mouth daily. Allergies   Allergen Reactions    Mobic [Meloxicam] Other (comments)     Bloating and severe constipation    Pcn [Penicillins] Rash    Penicillin G Rash    Septra [Sulfamethoprim] Rash    Sulfa (Sulfonamide Antibiotics) Other (comments)       Past Medical History:   Diagnosis Date    Arthritis     right hip    Cancer ([facility] 12/20/2012    Left Breast    Headache     Thyroid disease     hypothyroid       Past Surgical History:   Procedure Laterality Date    HX APPENDECTOMY  2016    HX BREAST LUMPECTOMY  2013 2013    HX CARPAL TUNNEL RELEASE  2014    right    HX CYST REMOVAL  1986    behind left knee    HX TYMPANOSTOMY  1986    bilateral    HX WISDOM TEETH EXTRACTION      x4       Family History   Problem Relation Age of Onset    Cancer Mother      colon,liver    Hypertension Mother     Heart Disease Father     Heart Attack Father     No Known Problems Brother     No Known Problems Maternal Grandmother     No Known Problems Maternal Grandfather     No Known Problems Paternal Grandmother     No Known Problems Paternal Grandfather     No Known Problems Son     Diabetes Daughter     Thyroid Disease Daughter        Social History   Substance Use Topics    Smoking status: Never Smoker    Smokeless tobacco: Never Used    Alcohol use No       ROS   Review of Systems   Constitutional: Negative for chills and fever. HENT: Negative for ear pain and sore throat. Eyes: Negative for blurred vision and pain. Respiratory: Negative for cough and shortness of breath. Cardiovascular: Negative for chest pain. Gastrointestinal: Negative for abdominal pain, blood in stool and melena. Genitourinary: Negative for dysuria and hematuria.    Musculoskeletal: Positive for joint pain (off/on). Negative for myalgias. Skin: Positive for itching and rash. Neurological: Negative for tingling, focal weakness and headaches. Endo/Heme/Allergies: Does not bruise/bleed easily. Psychiatric/Behavioral: Negative for substance abuse. Objective     Vitals:    06/05/18 0914   BP: 101/68   Pulse: 71   Resp: 12   Temp: 97.8 °F (36.6 °C)   TempSrc: Oral   SpO2: 96%   Weight: 136 lb 3.2 oz (61.8 kg)   Height: 5' 2\" (1.575 m)   PainSc:   0 - No pain       Physical Exam   Constitutional: She is oriented to person, place, and time and well-developed, well-nourished, and in no distress. HENT:   Head: Normocephalic and atraumatic. Right Ear: External ear normal.   Left Ear: External ear normal.   Nose: Nose normal.   Mouth/Throat: Oropharynx is clear and moist. No oropharyngeal exudate. Eyes: Conjunctivae and EOM are normal. Pupils are equal, round, and reactive to light. Right eye exhibits no discharge. Left eye exhibits no discharge. No scleral icterus. Neck: Neck supple. Cardiovascular: Normal rate, regular rhythm, normal heart sounds and intact distal pulses. Exam reveals no gallop and no friction rub. No murmur heard. Pulmonary/Chest: Effort normal and breath sounds normal. No respiratory distress. She has no wheezes. She has no rales. Abdominal: Soft. Bowel sounds are normal. She exhibits no distension. There is no tenderness. There is no rebound and no guarding. Musculoskeletal: She exhibits no edema or tenderness (BUE). Lymphadenopathy:     She has no cervical adenopathy. Neurological: She is alert and oriented to person, place, and time. She exhibits normal muscle tone. Gait normal.   Skin: Skin is warm and dry. There is stage II ulcer along her left inner thigh with minimal surrounding erythema, which is nontender to palpation.   She has a satellite lesion more proximal that is vesicular in appearance in the size of less than a pencil eraser; there is no surrounding erythema along his lesion. There is a third lesion on her bilateral anterior left thigh that is vesicular in appearance. There is no surrounding erythema along this lesion. Psychiatric: Affect normal.   Nursing note and vitals reviewed. LABS   Data Review:   Lab Results   Component Value Date/Time    WBC 6.0 12/21/2017 12:45 PM    HGB 13.6 12/21/2017 12:45 PM    HCT 41.0 12/21/2017 12:45 PM    PLATELET 110 36/14/7654 12:45 PM    MCV 97.6 (H) 12/21/2017 12:45 PM       Lab Results   Component Value Date/Time    Sodium 141 12/21/2017 12:45 PM    Potassium 3.9 12/21/2017 12:45 PM    Chloride 105 12/21/2017 12:45 PM    CO2 30 12/21/2017 12:45 PM    Anion gap 6 12/21/2017 12:45 PM    Glucose 86 12/21/2017 12:45 PM    BUN 14 12/21/2017 12:45 PM    Creatinine 0.73 12/21/2017 12:45 PM    BUN/Creatinine ratio 19 12/21/2017 12:45 PM    GFR est AA >60 12/21/2017 12:45 PM    GFR est non-AA >60 12/21/2017 12:45 PM    Calcium 8.6 12/21/2017 12:45 PM       Lab Results   Component Value Date/Time    Cholesterol, total 159 10/11/2017 04:34 PM    HDL Cholesterol 71 (H) 10/11/2017 04:34 PM    LDL, calculated 43.8 10/11/2017 04:34 PM    VLDL, calculated 44.2 10/11/2017 04:34 PM    Triglyceride 221 (H) 10/11/2017 04:34 PM    CHOL/HDL Ratio 2.2 10/11/2017 04:34 PM       No results found for: HBA1C, HGBE8, FBO6EFIJ, EII4HXCC, PIU3PQIN    Assessment/Plan:   1. Celiac disease and Colon Cancer Screening   Continue with maintaining a gluten free diet. I am requesting her last colonoscopy. 2. Malignant neoplasm of left female breast: Stable. S/p left lumpectomy and XRT. [facility] with [facility]. This was encouraged today. Checking a CBC and a CMP later this year. 3. Acquired hypothyroidism: Stable. Checking TFTs and a CBC later this year. Continue Synthroid for now.     4. Herpes zoster without complication: Per PE findings, she is healing well  I encouraged her to complete her Valtrex course. She has a couple more days left of medication to complete. 5.  General/Health Maintenance:  Checking a lipid panel to screen for hyperlipidemia later this year. Health Maintenance Due   Topic Date Due    Pneumococcal 19-64 Highest Risk (1 of 3 - PCV13) 12/20/1979    DTaP/Tdap/Td series (1 - Tdap) 12/20/1981    FOBT Q 1 YEAR AGE 50-75  12/20/2010     Lab review: labs are reviewed in the EHR    I have discussed the diagnosis with the patient and the intended plan as seen in the above orders. The patient has received an after-visit summary and questions were answered concerning future plans. I have discussed medication side effects and warnings with the patient as well. I have reviewed the plan of care with the patient, accepted their input and they are in agreement with the treatment goals. All questions were answered. The patient understands the plan of care. Handouts provided today with above information. Pt instructed if symptoms worsen to call the office or report to the ED for continued care. Greater than 50% of the visit time was spent in counseling and/or coordination of care. Follow-up Disposition:  Return in about 6 months (around 12/5/2018) for hypothyroidism, celiac disease, flu vaccine, pneumococcal vaccine.     Arnold Gomes MD

## 2018-06-05 NOTE — PATIENT INSTRUCTIONS
Health Maintenance Due   Topic Date Due    Pneumococcal 19-64 Highest Risk (1 of 3 - PCV13) 12/20/1979    DTaP/Tdap/Td series (1 - Tdap) 12/20/1981    FOBT Q 1 YEAR AGE 50-75  12/20/2010          Shingles: Care Instructions  Your Care Instructions    Shingles (herpes zoster) causes pain and a blistered rash. The rash can appear anywhere on the body but will be on only one side of the body, the left or right. It will be in a band, a strip, or a small area. The pain can be very severe. Shingles can also cause tingling or itching in the area of the rash. The blisters scab over after a few days and heal in 2 to 4 weeks. Medicines can help you feel better and may help prevent more serious problems caused by shingles. Shingles is caused by the same virus that causes chickenpox. When you have chickenpox, the virus gets into your nerve roots and stays there (becomes dormant) long after you get over the chickenpox. If the virus becomes active again, it can cause shingles. Follow-up care is a key part of your treatment and safety. Be sure to make and go to all appointments, and call your doctor if you are having problems. It's also a good idea to know your test results and keep a list of the medicines you take. How can you care for yourself at home? · Be safe with medicines. Take your medicines exactly as prescribed. Call your doctor if you think you are having a problem with your medicine. Antiviral medicine helps you get better faster. · Try not to scratch or pick at the blisters. They will crust over and fall off on their own if you leave them alone. · Put cool, wet cloths on the area to relieve pain and itching. You can also use calamine lotion. Try not to use so much lotion that it cakes and is hard to get off. · Put cornstarch or baking soda on the sores to help dry them out so they heal faster. · Do not use thick ointment, such as petroleum jelly, on the sores.  This will keep them from drying and healing. · To help remove loose crusts, soak them in tap water. This can help decrease oozing, and dry and soothe the skin. · Take an over-the-counter pain medicine, such as acetaminophen (Tylenol), ibuprofen (Advil, Motrin), or naproxen (Aleve). Read and follow all instructions on the label. · Avoid close contact with people until the blisters have healed. It is very important for you to avoid contact with anyone who has never had chickenpox or the chickenpox vaccine. Pregnant women, young babies, and anyone else who has a hard time fighting infection (such as someone with HIV, diabetes, or cancer) is especially at risk. When should you call for help? Call your doctor now or seek immediate medical care if:  ? · You have a new or higher fever. ? · You have a severe headache and a stiff neck. ? · You lose the ability to think clearly. ? · The rash spreads to your forehead, nose, eyes, or eyelids. ? · You have eye pain, or your vision gets worse. ? · You have new pain in your face, or you cannot move the muscles in your face. ? · Blisters spread to new parts of your body. ? Watch closely for changes in your health, and be sure to contact your doctor if:  ? · The rash has not healed after 2 to 4 weeks. ? · You still have pain after the rash has healed. Where can you learn more? Go to http://shaimr-majo.info/. Eleazar Miguel in the search box to learn more about \"Shingles: Care Instructions. \"  Current as of: March 3, 2017  Content Version: 11.4  © 8936-8262 [employer]. Care instructions adapted under license by [employer] (which disclaims liability or warranty for this information). If you have questions about a medical condition or this instruction, always ask your healthcare professional. [address] any warranty or liability for your use of this information.

## 2018-06-25 ENCOUNTER — OFFICE VISIT (OUTPATIENT)
Dept: INTERNAL MEDICINE CLINIC | Age: 58
End: 2018-06-25

## 2018-06-25 ENCOUNTER — HOSPITAL ENCOUNTER (OUTPATIENT)
Dept: LAB | Age: 58
Discharge: HOME | End: 2018-06-25

## 2018-06-25 VITALS
OXYGEN SATURATION: 95 % | WEIGHT: 133.6 LBS | HEIGHT: 62 IN | HEART RATE: 89 BPM | DIASTOLIC BLOOD PRESSURE: 71 MMHG | RESPIRATION RATE: 12 BRPM | SYSTOLIC BLOOD PRESSURE: 123 MMHG | TEMPERATURE: 97.9 F | BODY MASS INDEX: 24.59 KG/M2

## 2018-06-25 DIAGNOSIS — Z11.1 PPD SCREENING TEST: ICD-10-CM

## 2018-06-25 DIAGNOSIS — E03.9 ACQUIRED HYPOTHYROIDISM: ICD-10-CM

## 2018-06-25 DIAGNOSIS — K59.09 CHRONIC CONSTIPATION: Primary | ICD-10-CM

## 2018-06-25 DIAGNOSIS — Z12.11 SCREENING FOR COLON CANCER: ICD-10-CM

## 2018-06-25 DIAGNOSIS — N17.9 AKI (ACUTE KIDNEY INJURY) (HCC): ICD-10-CM

## 2018-06-25 DIAGNOSIS — B02.9 HERPES ZOSTER WITHOUT COMPLICATION: ICD-10-CM

## 2018-06-25 PROCEDURE — 99001 SPECIMEN HANDLING PT-LAB: CPT | Performed by: INTERNAL MEDICINE

## 2018-06-25 NOTE — MR AVS SNAPSHOT
Page Bernal 
 
 
 [address], [address] [facility] 
[phone] Patient: Nikunj Chowdhury 
MRN: [id number] Visit Information Date & Time Provider Department Dept. Phone Encounter #  
 6/25/2018 10:30 AM Thalia Carranza MD [facility] [id number] Your Appointments 6/27/2018 11:00 AM  
IMMUNIZATIONS/INJECTIONS with [place] [facility] NURSE VISIT [facility] ([facility]) Appt Note: tb read [address], [id number] [place] [address] [address]  
  
   
 [address], [address]  
  
    
 11/28/2018  8:25 AM  
LAB with [facility] NURSE VISIT [facility] ([facility]) Appt Note: lab  
 [address], [address] [place] [address] [address]  
  
   
 [address], [address]  
  
    
 12/5/2018 10:00 AM  
Office Visit with Thalia Carranza MD  
[facility]) Appt Note: ov 6mos. [employer] [address], [address] [address] [address]  
  
   
 [address], [address] Upcoming Health Maintenance Date Due Pneumococcal 19-64 Highest Risk (1 of 3 - PCV13) 12/20/1979 DTaP/Tdap/Td series (1 - Tdap) 12/20/1981 FOBT Q 1 YEAR AGE 50-75 12/20/2010 Influenza Age 5 to Adult 8/1/2018 BREAST CANCER SCRN MAMMOGRAM 1/30/2020 PAP AKA CERVICAL CYTOLOGY 11/14/2020 Allergies as of 6/25/2018  Review Complete On: 6/25/2018 By: Jefm Phalen Severity Noted Reaction Type Reactions Mobic [Meloxicam] High 06/05/2018    Other (comments) Bloating and severe constipation Other Medication High   Rash Septra Pcn [Penicillins] High 10/11/2017    Rash Penicillin G High   Rash Septra [Sulfamethoprim] High 10/11/2017    Rash  
 Sulfa (Sulfonamide Antibiotics) High   Other (comments) Current Immunizations  Never Reviewed Name Date  
 TB Skin Test (PPD) Intradermal 6/25/2018 Not reviewed this visit You Were Diagnosed With   
  
 Codes Comments Chronic constipation    -  Primary ICD-10-CM: K59.09 
ICD-9-CM: 564.00 Screening for colon cancer     ICD-10-CM: Z12.11 ICD-9-CM: V76.51 Herpes zoster without complication     ILD-34-OX: B02.9 ICD-9-CM: 053.9 PPD screening test     ICD-10-CM: Z11.1 ICD-9-CM: V74.1 WILLIE (acute kidney injury) ([facility] [address]     ICD-10-CM: N17.9 ICD-9-CM: 799. 9 Acquired hypothyroidism     ICD-10-CM: E03.9 ICD-9-CM: 525. 9 Vitals BP Pulse Temp Resp Height(growth percentile) Weight(growth percentile) 123/71 (BP 1 Location: Right arm, BP Patient Position: Sitting) 89 97.9 °F (36.6 °C) (Oral) 12 5' 2\" (1.575 m) 133 lb 9.6 oz (60.6 kg) SpO2 BMI OB Status Smoking Status 95% 24.44 kg/m2 Menopause Never Smoker BMI and BSA Data Body Mass Index Body Surface Area  
 24.44 kg/m 2 1.63 m 2 Preferred Pharmacy Pharmacy Name Phone [address] [place] [address], [facility]. [facility] [phone] Your Updated Medication List  
  
   
This list is accurate as of 6/25/18 11:39 AM.  Always use your most recent med list.  
  
  
  
  
 biotin 5,000 mcg Tbdi Take 5,000 mcg by mouth daily. ibuprofen 200 mg tablet Commonly known as:  MOTRIN Take 800 mg by mouth every six (6) hours as needed for Pain.  
  
 magnesium 200 mg Tab Take 400 mg by mouth daily. potassium 99 mg tablet Take 120 mg by mouth daily. PROBIOTIC 4X 10-15 mg Tbec Generic drug:  B.infantis-B.ani-B.long-B.bifi Take  by mouth daily. selenium 200 mcg Cap Take 200 mg by mouth daily. SYNTHROID 100 mcg tablet Generic drug:  levothyroxine Take 1 Tab by mouth Daily (before breakfast). Indications: hypothyroidism TYLENOL ARTHRITIS PAIN 650 mg Ashley Apryl Generic drug:  acetaminophen Take 650 mg by mouth every six (6) hours as needed for Pain. VITAMIN D3 1,000 unit Cap Generic drug:  cholecalciferol Take 2,000 Units by mouth daily. We Performed the Following AMB POC TUBERCULOSIS, INTRADERMAL (SKIN TEST) [70926 CPT(R)] REFERRAL TO GASTROENTEROLOGY [ZPM61 Custom] To-Do List   
 06/25/2018 Lab:  CBC WITH AUTOMATED DIFF   
  
 06/25/2018 Lab:  METABOLIC PANEL, COMPREHENSIVE Around 06/25/2018 Lab:  TSH AND FREE T4 Referral Information Referral ID Referred By Referred To  
  
 [id number] Alvino Maxwell Not Available Visits Status Start Date End Date 1 New Request 6/25/18 6/25/19 If your referral has a status of pending review or denied, additional information will be sent to support the outcome of this decision. Patient Instructions Body Mass Index: Care Instructions Your Care Instructions Body mass index (BMI) can help you see if your weight is raising your risk for health problems. It uses a formula to compare how much you weigh with how tall you are. · A BMI lower than 18.5 is considered underweight. · A BMI between 18.5 and 24.9 is considered healthy. · A BMI between 25 and 29.9 is considered overweight. A BMI of 30 or higher is considered obese. If your BMI is in the normal range, it means that you have a lower risk for weight-related health problems. If your BMI is in the overweight or obese range, you may be at increased risk for weight-related health problems, such as high blood pressure, heart disease, stroke, arthritis or joint pain, and diabetes. If your BMI is in the underweight range, you may be at increased risk for health problems such as fatigue, lower protection (immunity) against illness, muscle loss, bone loss, hair loss, and hormone problems. BMI is just one measure of your risk for weight-related health problems. You may be at higher risk for health problems if you are not active, you eat an unhealthy diet, or you drink too much alcohol or use tobacco products. Follow-up care is a key part of your treatment and safety. Be sure to make and go to all appointments, and call your doctor if you are having problems. It's also a good idea to know your test results and keep a list of the medicines you take. How can you care for yourself at home? · Practice healthy eating habits. This includes eating plenty of fruits, vegetables, whole grains, lean protein, and low-fat dairy. · If your doctor recommends it, get more exercise. Walking is a good choice. Bit by bit, increase the amount you walk every day. Try for at least 30 minutes on most days of the week. · Do not smoke. Smoking can increase your risk for health problems. If you need help quitting, talk to your doctor about stop-smoking programs and medicines. These can increase your chances of quitting for good. · Limit alcohol to 2 drinks a day for men and 1 drink a day for women. Too much alcohol can cause health problems. If you have a BMI higher than 25 · Your doctor may do other tests to check your risk for weight-related health problems. This may include measuring the distance around your waist. A waist measurement of more than 40 inches in men or 35 inches in women can increase the risk of weight-related health problems. · Talk with your doctor about steps you can take to stay healthy or improve your health. You may need to make lifestyle changes to lose weight and stay healthy, such as changing your diet and getting regular exercise. If you have a BMI lower than 18.5 · Your doctor may do other tests to check your risk for health problems. · Talk with your doctor about steps you can take to stay healthy or improve your health.  You may need to make lifestyle changes to gain or maintain weight and stay healthy, such as getting more healthy foods in your diet and doing exercises to build muscle. Where can you learn more? Go to http://shamir-majo.info/. Enter S176 in the search box to learn more about \"Body Mass Index: Care Instructions. \" Current as of: October 13, 2016 Content Version: 11.4 © 1112-9119 [employer]. Care instructions adapted under license by [employer] (which disclaims liability or warranty for this information). If you have questions about a medical condition or this instruction, always ask your healthcare professional. [address] any warranty or liability for your use of this information. Health Maintenance Due Topic Date Due  Pneumococcal 19-64 Highest Risk (1 of 3 - PCV13) 12/20/1979  
 DTaP/Tdap/Td series (1 - Tdap) 12/20/1981  
 FOBT Q 1 YEAR AGE 50-75  12/20/2010 Placed PPD in Right Forearm and Patient understands to come back within 48-72 hours for her PPD Reading. Introducing [facility]! Michelle Kennedy introduces byUs patient portal. Now you can access parts of your medical record, email your doctor's office, and request medication refills online. 1. In your internet browser, go to [email or web address] 2. Click on the First Time User? Click Here link in the Sign In box. You will see the New Member Sign Up page. 3. Enter your byUs Access Code exactly as it appears below. You will not need to use this code after youve completed the sign-up process. If you do not sign up before the expiration date, you must request a new code. · byUs Access Code: [email or web address] Expires: 9/3/2018  8:58 AM 
 
4. Enter the last four digits of your Social Security Number (xxxx) and Date of Birth (mm/dd/yyyy) as indicated and click Submit. You will be taken to the next sign-up page. 5. Create a byUs ID. This will be your byUs login ID and cannot be changed, so think of one that is secure and easy to remember. 6. Create a [employer] password. You can change your password at any time. 7. Enter your Password Reset Question and Answer. This can be used at a later time if you forget your password. 8. Enter your e-mail address. You will receive e-mail notification when new information is available in [address] 9. Click Sign Up. You can now view and download portions of your medical record. 10. Click the Download Summary menu link to download a portable copy of your medical information. If you have questions, please visit the Frequently Asked Questions section of the [employer] website. Remember, [employer] is NOT to be used for urgent needs. For medical emergencies, dial 911. Now available from your iPhone and Android! Please provide this summary of care documentation to your next provider. Your primary care clinician is listed as Belinda Davidson. If you have any questions after today's visit, please call [phone].

## 2018-06-25 NOTE — PATIENT INSTRUCTIONS
Body Mass Index: Care Instructions  Your Care Instructions    Body mass index (BMI) can help you see if your weight is raising your risk for health problems. It uses a formula to compare how much you weigh with how tall you are. · A BMI lower than 18.5 is considered underweight. · A BMI between 18.5 and 24.9 is considered healthy. · A BMI between 25 and 29.9 is considered overweight. A BMI of 30 or higher is considered obese. If your BMI is in the normal range, it means that you have a lower risk for weight-related health problems. If your BMI is in the overweight or obese range, you may be at increased risk for weight-related health problems, such as high blood pressure, heart disease, stroke, arthritis or joint pain, and diabetes. If your BMI is in the underweight range, you may be at increased risk for health problems such as fatigue, lower protection (immunity) against illness, muscle loss, bone loss, hair loss, and hormone problems. BMI is just one measure of your risk for weight-related health problems. You may be at higher risk for health problems if you are not active, you eat an unhealthy diet, or you drink too much alcohol or use tobacco products. Follow-up care is a key part of your treatment and safety. Be sure to make and go to all appointments, and call your doctor if you are having problems. It's also a good idea to know your test results and keep a list of the medicines you take. How can you care for yourself at home? · Practice healthy eating habits. This includes eating plenty of fruits, vegetables, whole grains, lean protein, and low-fat dairy. · If your doctor recommends it, get more exercise. Walking is a good choice. Bit by bit, increase the amount you walk every day. Try for at least 30 minutes on most days of the week. · Do not smoke. Smoking can increase your risk for health problems. If you need help quitting, talk to your doctor about stop-smoking programs and medicines. These can increase your chances of quitting for good. · Limit alcohol to 2 drinks a day for men and 1 drink a day for women. Too much alcohol can cause health problems. If you have a BMI higher than 25  · Your doctor may do other tests to check your risk for weight-related health problems. This may include measuring the distance around your waist. A waist measurement of more than 40 inches in men or 35 inches in women can increase the risk of weight-related health problems. · Talk with your doctor about steps you can take to stay healthy or improve your health. You may need to make lifestyle changes to lose weight and stay healthy, such as changing your diet and getting regular exercise. If you have a BMI lower than 18.5  · Your doctor may do other tests to check your risk for health problems. · Talk with your doctor about steps you can take to stay healthy or improve your health. You may need to make lifestyle changes to gain or maintain weight and stay healthy, such as getting more healthy foods in your diet and doing exercises to build muscle. Where can you learn more? Go to http://shamir-majo.info/. Enter S176 in the search box to learn more about \"Body Mass Index: Care Instructions. \"  Current as of: October 13, 2016  Content Version: 11.4  © 8183-9804 [employer]. Care instructions adapted under license by [employer] (which disclaims liability or warranty for this information). If you have questions about a medical condition or this instruction, always ask your healthcare professional. [place] [address] any warranty or liability for your use of this information.   Health Maintenance Due   Topic Date Due    Pneumococcal 19-64 Highest Risk (1 of 3 - PCV13) 12/20/1979    DTaP/Tdap/Td series (1 - Tdap) 12/20/1981    FOBT Q 1 YEAR AGE 50-75  12/20/2010     Placed PPD in Right Forearm and Patient understands to come back within 48-72 hours for her PPD Reading.

## 2018-06-25 NOTE — PROGRESS NOTES
[facility]:  6/25/2018, MRN: [id number]      Za Mckeon is a 62 y.o. female and presents to clinic for Medication Evaluation (discuss medication she had been on Meloxicam and would like to know if her Kidneys ok) and PPD Placement (patient states for her work she needs to update)    Subjective: The pt is a 59-year-old female with history of left breast adenocarcinoma (s/p lumpectomy and XRT in 2012), celiac disease, chronic constipation, and hypothyroidism. 1. Colon cancer screening and Chronic Constipation: Done 4 yrs ago per pt hx at her last apt but we do not have records. No hematochezia/melena. She has \"severe constipation. \" She stays hydrated with water. She eats a lot of vegetables. She is straining to have a BM. She has nausea and bloating. She takes suppositories as needed for chronic constipation. Constipation has been present for yrs. She has to press on her abdomen in order to have a BM while sitting on the toilet. S/p appendectomy. She also takes MOM off/on. She tried linzess in the past but had adverse side effects. She was diagnosed with celiac disease in the past per her hx and is maintaining a gluten free diet. Note that she has hypothyroidism, treated with Synthroid. Her last TFTs were unremarkable. Lab Results   Component Value Date/Time    TSH 1.91 12/21/2017 12:45 PM     2. Shingles: Diagnosed 5/29/18. Treated with valtrex course. Not associated with pain. The rash area is no longer pruritic. 3. WILLIE: She was told by her Rheumatology team that her kidney function was slightly abnormal in January 2018. She was taking at that time NSAIDs daily (Meloxicam). She stopped taking meloxicam since then and wants to have lab work to assess her renal function. She takes ibuprofen as needed for jt pain/aches but not on a daily basis.       Patient Active Problem List    Diagnosis Date Noted    Malignant neoplasm of left female breast ([facility] [address] - ductal adenocarcinoma 10/11/2017    Acquired hypothyroidism 10/11/2017    Celiac disease 10/11/2017       Current Outpatient Prescriptions   Medication Sig Dispense Refill    selenium 200 mcg cap Take 200 mg by mouth daily.  potassium 99 mg tablet Take 120 mg by mouth daily.  SYNTHROID 100 mcg tablet Take 1 Tab by mouth Daily (before breakfast). Indications: hypothyroidism 90 Tab 3    acetaminophen (TYLENOL ARTHRITIS PAIN) 650 mg CR tablet Take 650 mg by mouth every six (6) hours as needed for Pain.  ibuprofen (MOTRIN) 200 mg tablet Take 800 mg by mouth every six (6) hours as needed for Pain.  cholecalciferol (VITAMIN D3) 1,000 unit cap Take 2,000 Units by mouth daily.  biotin 5,000 mcg TbDi Take 5,000 mcg by mouth daily.  magnesium 200 mg tab Take 400 mg by mouth daily.  B.infantis-B.ani-B.long-B.bifi (PROBIOTIC 4X) 10-15 mg TbEC Take  by mouth daily.          Allergies   Allergen Reactions    Mobic [Meloxicam] Other (comments)     Bloating and severe constipation    Other Medication Rash     Septra     Pcn [Penicillins] Rash    Penicillin G Rash    Septra [Sulfamethoprim] Rash    Sulfa (Sulfonamide Antibiotics) Other (comments)       Past Medical History:   Diagnosis Date    Arthritis     right hip    Cancer ([facility] [address] 12/20/2012    Left Breast    Headache     Thyroid disease     hypothyroid       Past Surgical History:   Procedure Laterality Date    HX APPENDECTOMY  2016    HX BREAST LUMPECTOMY  2013 2013    HX CARPAL TUNNEL RELEASE  2014    right    HX CYST REMOVAL  1986    behind left knee    HX TYMPANOSTOMY  1986    bilateral    HX WISDOM TEETH EXTRACTION      x4       Family History   Problem Relation Age of Onset    Cancer Mother      colon,liver    Hypertension Mother     Heart Disease Father     Heart Attack Father     No Known Problems Brother     No Known Problems Maternal Grandmother     No Known Problems Maternal Grandfather     No Known Problems Paternal Grandmother     No Known Problems Paternal Grandfather     No Known Problems Son     Diabetes Daughter     Thyroid Disease Daughter        Social History   Substance Use Topics    Smoking status: Never Smoker    Smokeless tobacco: Never Used    Alcohol use No       ROS   Review of Systems   Constitutional: Negative for chills and fever. HENT: Negative for ear pain and sore throat. Eyes: Negative for blurred vision and pain. Respiratory: Negative for cough and shortness of breath. Cardiovascular: Negative for chest pain. Gastrointestinal: Positive for constipation. Negative for abdominal pain, blood in stool and melena. Genitourinary: Negative for dysuria and hematuria. Musculoskeletal: Positive for joint pain (chronic). Negative for myalgias. Skin: Negative for rash. Neurological: Negative for tingling, focal weakness and headaches. Endo/Heme/Allergies: Does not bruise/bleed easily. Psychiatric/Behavioral: Negative for substance abuse. Objective     Vitals:    06/25/18 1059   BP: 123/71   Pulse: 89   Resp: 12   Temp: 97.9 °F (36.6 °C)   TempSrc: Oral   SpO2: 95%   Weight: 133 lb 9.6 oz (60.6 kg)   Height: 5' 2\" (1.575 m)   PainSc:   3   PainLoc: Hip       Physical Exam   Constitutional: She is oriented to person, place, and time and well-developed, well-nourished, and in no distress. HENT:   Head: Normocephalic and atraumatic. Right Ear: External ear normal.   Left Ear: External ear normal.   Nose: Nose normal.   Mouth/Throat: Oropharynx is clear and moist. No oropharyngeal exudate. Eyes: Conjunctivae and EOM are normal. Pupils are equal, round, and reactive to light. Right eye exhibits no discharge. Left eye exhibits no discharge. No scleral icterus. Neck: Neck supple. Cardiovascular: Normal rate, regular rhythm, normal heart sounds and intact distal pulses. Exam reveals no gallop and no friction rub. No murmur heard.   Pulmonary/Chest: Effort normal and breath sounds normal. No respiratory distress. She has no wheezes. She has no rales. Abdominal: Soft. Bowel sounds are normal. She exhibits no distension. There is no tenderness. There is no rebound and no guarding. Musculoskeletal: She exhibits no edema or tenderness (BUE). Lymphadenopathy:     She has no cervical adenopathy. Neurological: She is alert and oriented to person, place, and time. She exhibits normal muscle tone. Gait normal.   Skin: Skin is warm and dry. No erythema. No vesicles are present along the previous shingles site. Psychiatric: Affect normal.   Nursing note and vitals reviewed. LABS   Data Review:   Lab Results   Component Value Date/Time    WBC 6.0 12/21/2017 12:45 PM    HGB 13.6 12/21/2017 12:45 PM    HCT 41.0 12/21/2017 12:45 PM    PLATELET 561 13/85/3080 12:45 PM    MCV 97.6 (H) 12/21/2017 12:45 PM       Lab Results   Component Value Date/Time    Sodium 141 12/21/2017 12:45 PM    Potassium 3.9 12/21/2017 12:45 PM    Chloride 105 12/21/2017 12:45 PM    CO2 30 12/21/2017 12:45 PM    Anion gap 6 12/21/2017 12:45 PM    Glucose 86 12/21/2017 12:45 PM    BUN 14 12/21/2017 12:45 PM    Creatinine 0.73 12/21/2017 12:45 PM    BUN/Creatinine ratio 19 12/21/2017 12:45 PM    GFR est AA >60 12/21/2017 12:45 PM    GFR est non-AA >60 12/21/2017 12:45 PM    Calcium 8.6 12/21/2017 12:45 PM       Lab Results   Component Value Date/Time    Cholesterol, total 159 10/11/2017 04:34 PM    HDL Cholesterol 71 (H) 10/11/2017 04:34 PM    LDL, calculated 43.8 10/11/2017 04:34 PM    VLDL, calculated 44.2 10/11/2017 04:34 PM    Triglyceride 221 (H) 10/11/2017 04:34 PM    CHOL/HDL Ratio 2.2 10/11/2017 04:34 PM       No results found for: HBA1C, HGBE8, WTM0MCWU, IUP5NDQB, GDQ6ZTAK    Assessment/Plan:   1. Colon cancer screening and Chronic Constipation:  - Referral placed today for colon cancer screening and for chronic constipation.  - Checking a CBC, TFTs, and a CMP    2. Shingles: S/p valtrex. - Observation.     3. NSAID Use and WILLIE;  - CMP ordered. Health Maintenance Due   Topic Date Due    Pneumococcal 19-64 Highest Risk (1 of 3 - PCV13) 12/20/1979    DTaP/Tdap/Td series (1 - Tdap) 12/20/1981    FOBT Q 1 YEAR AGE 50-75  12/20/2010     Lab review: labs are reviewed in the EHR    I have discussed the diagnosis with the patient and the intended plan as seen in the above orders. The patient has received an after-visit summary and questions were answered concerning future plans. I have discussed medication side effects and warnings with the patient as well. I have reviewed the plan of care with the patient, accepted their input and they are in agreement with the treatment goals. All questions were answered. The patient understands the plan of care. Handouts provided today with above information. Pt instructed if symptoms worsen to call the office or report to the ED for continued care. Greater than 50% of the visit time was spent in counseling and/or coordination of care. Voice recognition was used to generate this report, which may have resulted in some phonetic based errors in grammar and contents. Even though attempts were made to correct all the mistakes, some may have been missed, and remained in the body of the document. Follow-up Disposition:  Return if symptoms worsen or fail to improve.     Angela Hammond MD

## 2018-06-25 NOTE — PROGRESS NOTES
Chief Complaint   Patient presents with    Medication Evaluation     discuss medication she had been on Meloxicam and would like to know if her Kidneys ok    PPD Placement     patient states for her work she needs to update     Placed PPD in Right Forearm and Patient understands to come back within 48-72 hours for her PPD Reading. 1. Have you been to the ER, urgent care clinic since your last visit? Hospitalized since your last visit? No    2. Have you seen or consulted any other health care providers outside of the [address] [place] [address] since your last visit? Include any pap smears or colon screening.  No

## 2018-06-26 LAB
ALBUMIN SERPL-MCNC: 4.4 G/DL (ref 3.5–5.5)
ALBUMIN/GLOB SERPL: 1.8 {RATIO} (ref 1.2–2.2)
ALP SERPL-CCNC: 86 IU/L (ref 39–117)
ALT SERPL-CCNC: 17 IU/L (ref 0–32)
AST SERPL-CCNC: 23 IU/L (ref 0–40)
BASOPHILS # BLD AUTO: 0 X10E3/UL (ref 0–0.2)
BASOPHILS NFR BLD AUTO: 0 %
BILIRUB SERPL-MCNC: 0.5 MG/DL (ref 0–1.2)
BUN SERPL-MCNC: 11 MG/DL (ref 6–24)
BUN/CREAT SERPL: 18 (ref 9–23)
CALCIUM SERPL-MCNC: 9.5 MG/DL (ref 8.7–10.2)
CHLORIDE SERPL-SCNC: 104 MMOL/L (ref 96–106)
CO2 SERPL-SCNC: 25 MMOL/L (ref 20–29)
CREAT SERPL-MCNC: 0.62 MG/DL (ref 0.57–1)
EOSINOPHIL # BLD AUTO: 0.1 X10E3/UL (ref 0–0.4)
EOSINOPHIL NFR BLD AUTO: 1 %
ERYTHROCYTE [DISTWIDTH] IN BLOOD BY AUTOMATED COUNT: 13.2 % (ref 12.3–15.4)
GFR SERPLBLD CREATININE-BSD FMLA CKD-EPI: 100 ML/MIN/1.73
GFR SERPLBLD CREATININE-BSD FMLA CKD-EPI: 116 ML/MIN/1.73
GLOBULIN SER CALC-MCNC: 2.5 G/DL (ref 1.5–4.5)
GLUCOSE SERPL-MCNC: 86 MG/DL (ref 65–99)
HCT VFR BLD AUTO: 38.3 % (ref 34–46.6)
HGB BLD-MCNC: 13 G/DL (ref 11.1–15.9)
IMM GRANULOCYTES # BLD: 0 X10E3/UL (ref 0–0.1)
IMM GRANULOCYTES NFR BLD: 0 %
LYMPHOCYTES # BLD AUTO: 0.9 X10E3/UL (ref 0.7–3.1)
LYMPHOCYTES NFR BLD AUTO: 19 %
MCH RBC QN AUTO: 32.9 PG (ref 26.6–33)
MCHC RBC AUTO-ENTMCNC: 33.9 G/DL (ref 31.5–35.7)
MCV RBC AUTO: 97 FL (ref 79–97)
MONOCYTES # BLD AUTO: 0.2 X10E3/UL (ref 0.1–0.9)
MONOCYTES NFR BLD AUTO: 5 %
NEUTROPHILS # BLD AUTO: 3.4 X10E3/UL (ref 1.4–7)
NEUTROPHILS NFR BLD AUTO: 75 %
PLATELET # BLD AUTO: 240 X10E3/UL (ref 150–379)
POTASSIUM SERPL-SCNC: 4.2 MMOL/L (ref 3.5–5.2)
PROT SERPL-MCNC: 6.9 G/DL (ref 6–8.5)
RBC # BLD AUTO: 3.95 X10E6/UL (ref 3.77–5.28)
SODIUM SERPL-SCNC: 142 MMOL/L (ref 134–144)
T4 FREE SERPL-MCNC: 1.99 NG/DL (ref 0.82–1.77)
TSH SERPL DL<=0.005 MIU/L-ACNC: 0.49 UIU/ML (ref 0.45–4.5)
WBC # BLD AUTO: 4.5 X10E3/UL (ref 3.4–10.8)

## 2018-06-27 ENCOUNTER — CLINICAL SUPPORT (OUTPATIENT)
Dept: INTERNAL MEDICINE CLINIC | Age: 58
End: 2018-06-27

## 2018-06-27 ENCOUNTER — TELEPHONE (OUTPATIENT)
Dept: INTERNAL MEDICINE CLINIC | Age: 58
End: 2018-06-27

## 2018-06-27 DIAGNOSIS — E03.9 ACQUIRED HYPOTHYROIDISM: Primary | ICD-10-CM

## 2018-06-27 DIAGNOSIS — R79.89 ABNORMAL THYROID BLOOD TEST: ICD-10-CM

## 2018-06-27 DIAGNOSIS — Z11.1 ENCOUNTER FOR PPD SKIN TEST READING: Primary | ICD-10-CM

## 2018-06-27 NOTE — PROGRESS NOTES
Please schedule her for f/u labs in 3 wks. Her fT4 is slightly elevated which could indicate that her Synthroid dose is too high. If her thyroid hormone level is still elevated when rechecked in 3 wks, I will need to adjust her dose.  Meanwhile, her blood counts and liver/kidney function are normal.    Dr. Sathya Chowdhury  [facility], [address] [place] [address], [address] [place] [address] Str.  Phone: [phone]  Fax: [phone]

## 2018-07-06 ENCOUNTER — TELEPHONE (OUTPATIENT)
Dept: INTERNAL MEDICINE CLINIC | Age: 58
End: 2018-07-06

## 2018-07-06 NOTE — TELEPHONE ENCOUNTER
----- Message from Francine Mark MD sent at 6/27/2018 12:50 PM EDT -----  Please schedule her for f/u labs in 3 wks. Her fT4 is slightly elevated which could indicate that her Synthroid dose is too high. If her thyroid hormone level is still elevated when rechecked in 3 wks, I will need to adjust her dose. Meanwhile, her blood counts and liver/kidney function are normal.    Dr. Liu Martinez  [facility] [address], [address] [place] [address] Str.  Phone: [phone]  Fax: [phone]    Chief Complaint   Patient presents with    Labs     done 6-25-18 per Dr Tami Gan     Left a voice message for patient to return my call. Patient returned my call and has been informed to have her labs rechecked on her TSH AND FREE T4 with in the next 3 weeks per Dr Tami Gan and states she understands all.

## 2018-07-25 ENCOUNTER — HOSPITAL ENCOUNTER (OUTPATIENT)
Dept: LAB | Age: 58
Discharge: HOME | End: 2018-07-25
Payer: OTHER GOVERNMENT

## 2018-07-25 ENCOUNTER — APPOINTMENT (OUTPATIENT)
Dept: INTERNAL MEDICINE CLINIC | Age: 58
End: 2018-07-25

## 2018-07-25 DIAGNOSIS — R79.89 ABNORMAL THYROID BLOOD TEST: ICD-10-CM

## 2018-07-25 DIAGNOSIS — E03.9 ACQUIRED HYPOTHYROIDISM: ICD-10-CM

## 2018-07-25 PROCEDURE — 84443 ASSAY THYROID STIM HORMONE: CPT | Performed by: INTERNAL MEDICINE

## 2018-07-25 PROCEDURE — 36415 COLL VENOUS BLD VENIPUNCTURE: CPT | Performed by: INTERNAL MEDICINE

## 2018-07-26 ENCOUNTER — TELEPHONE (OUTPATIENT)
Dept: INTERNAL MEDICINE CLINIC | Age: 58
End: 2018-07-26

## 2018-07-26 NOTE — PROGRESS NOTES
Please let her know that her thyroid labs are normal. No changes need to be made to her Synthroid dose at this time.     Dr. Jazmyne Van  [facility] [address], [address] [place] [address] Str.  Phone: [phone]  Fax: [phone]

## 2018-07-26 NOTE — TELEPHONE ENCOUNTER
Please let her know that her thyroid labs are normal. No changes need to be made to her Synthroid dose at this time. Chief Complaint   Patient presents with    Labs     done 7--25-18 per      Patient reached and informed, she states she understands all.

## 2018-08-15 ENCOUNTER — OFFICE VISIT (OUTPATIENT)
Dept: INTERNAL MEDICINE CLINIC | Age: 58
End: 2018-08-15

## 2018-08-15 VITALS
DIASTOLIC BLOOD PRESSURE: 67 MMHG | SYSTOLIC BLOOD PRESSURE: 124 MMHG | WEIGHT: 134 LBS | BODY MASS INDEX: 24.66 KG/M2 | OXYGEN SATURATION: 98 % | TEMPERATURE: 97.7 F | HEART RATE: 71 BPM | HEIGHT: 62 IN | RESPIRATION RATE: 14 BRPM

## 2018-08-15 DIAGNOSIS — H60.92 OTITIS EXTERNA OF LEFT EAR, UNSPECIFIED CHRONICITY, UNSPECIFIED TYPE: Primary | ICD-10-CM

## 2018-08-15 RX ORDER — CIPROFLOXACIN AND DEXAMETHASONE 3; 1 MG/ML; MG/ML
4 SUSPENSION/ DROPS AURICULAR (OTIC) 2 TIMES DAILY
Qty: 7.5 ML | Refills: 0 | Status: SHIPPED | OUTPATIENT
Start: 2018-08-15 | End: 2018-08-16 | Stop reason: SDUPTHER

## 2018-08-15 RX ORDER — CIPROFLOXACIN AND DEXAMETHASONE 3; 1 MG/ML; MG/ML
4 SUSPENSION/ DROPS AURICULAR (OTIC) 2 TIMES DAILY
Qty: 7.5 ML | Refills: 0 | Status: SHIPPED | OUTPATIENT
Start: 2018-08-15 | End: 2018-08-15 | Stop reason: SDUPTHER

## 2018-08-15 NOTE — MR AVS SNAPSHOT
[address] [facility] 
 
 
 [address], [address] [facility] 
[phone] Patient: Foster Em 
MRN: [id number] Visit Information Date & Time Provider Department Dept. Phone Encounter #  
 8/15/2018  1:30 PM Estefanía Melendrez MD [facility] [phone] [id number] Your Appointments 8/28/2018  2:00 PM  
COLON SCREEN with TSS HBV NURSE VISIT [address] ([facility]) Appt Note: Dr. Jonnie Fountain referring [address] [place] [address] [address] [facility] [address] [facility] [id number]  
  
    
 8/29/2018  8:00 AM  
Office Visit with Estefanía Melendrez MD  
[facility] Appt Note: 2 week follow up  
 [address], [address] [facility] [address]  
  
   
 [address], [address]  
  
    
 11/28/2018  8:25 AM  
LAB with [facility] NURSE VISIT [facility] ([facility]) Appt Note: lab  
 [address], [address] [facility] [address]  
  
   
 [address], [address]  
  
    
 12/5/2018 10:00 AM  
Office Visit with Estefanía Melendrez MD  
[facility] Appt Note: ov 6mos. [employer] [address], [address] [facility] [address]  
[phone] Upcoming Health Maintenance Date Due Pneumococcal 19-64 Highest Risk (1 of 3 - PCV13) 12/20/1979 DTaP/Tdap/Td series (1 - Tdap) 12/20/1981 FOBT Q 1 YEAR AGE 50-75 12/20/2010 Influenza Age 5 to Adult 8/1/2018 BREAST CANCER SCRN MAMMOGRAM 1/30/2020 PAP AKA CERVICAL CYTOLOGY 11/14/2020 Allergies as of 8/15/2018  Review Complete On: 8/15/2018 By: Star Found, LPN Severity Noted Reaction Type Reactions Mobic [Meloxicam] High 06/05/2018    Other (comments) Bloating and severe constipation Other Medication High   Rash Septra Pcn [Penicillins] High 10/11/2017    Rash Penicillin G High   Rash Septra [Sulfamethoprim] High 10/11/2017    Rash  
 Sulfa (Sulfonamide Antibiotics) High   Other (comments) Current Immunizations  Never Reviewed Name Date  
 TB Skin Test (PPD) Intradermal 6/25/2018 Not reviewed this visit You Were Diagnosed With   
  
 Codes Comments Otitis externa of left ear, unspecified chronicity, unspecified type    -  Primary ICD-10-CM: H60.92 
ICD-9-CM: 380.10 Vitals BP Pulse Temp Resp Height(growth percentile) Weight(growth percentile) 124/67 71 97.7 °F (36.5 °C) 14 5' 2\" (1.575 m) 134 lb (60.8 kg) SpO2 BMI OB Status Smoking Status 98% 24.51 kg/m2 Menopause Never Smoker BMI and BSA Data Body Mass Index Body Surface Area 24.51 kg/m 2 1.63 m 2 Preferred Pharmacy Pharmacy Name Phone [address] [place] [address], [address] [place], [place] [phone] Your Updated Medication List  
  
   
This list is accurate as of 8/15/18  2:19 PM.  Always use your most recent med list.  
  
  
  
  
 biotin 5,000 mcg Tbdi Take 5,000 mcg by mouth daily. ciprofloxacin-dexamethasone 0.3-0.1 % otic suspension Commonly known as:  Rambo Power Administer 4 Drops in left ear two (2) times a day for 10 days. ibuprofen 200 mg tablet Commonly known as:  MOTRIN Take 800 mg by mouth every six (6) hours as needed for Pain.  
  
 magnesium 200 mg Tab Take 400 mg by mouth daily. potassium 99 mg tablet Take 120 mg by mouth daily. PROBIOTIC 4X 10-15 mg Tbec Generic drug:  B.infantis-B.ani-B.long-B.bifi Take  by mouth daily. selenium 200 mcg Cap Take 200 mg by mouth daily. SYNTHROID 100 mcg tablet Generic drug:  levothyroxine Take 1 Tab by mouth Daily (before breakfast). Indications: hypothyroidism TYLENOL ARTHRITIS PAIN 650 mg Liborio Pali Generic drug:  acetaminophen Take 650 mg by mouth every six (6) hours as needed for Pain. VITAMIN D3 1,000 unit Cap Generic drug:  cholecalciferol Take 2,000 Units by mouth daily. Prescriptions Printed Refills  
 ciprofloxacin-dexamethasone (CIPRODEX) 0.3-0.1 % otic suspension 0 Sig: Administer 4 Drops in left ear two (2) times a day for 10 days. Class: Print Route: Left Ear Introducing [facility]! Jodie Robbins introduces [email or web address] patient portal. Now you can access parts of your medical record, email your doctor's office, and request medication refills online. 1. In your internet browser, go to [email or web address] 2. Click on the First Time User? Click Here link in the Sign In box. You will see the New Member Sign Up page. 3. Enter your [email or web address] Access Code exactly as it appears below. You will not need to use this code after youve completed the sign-up process. If you do not sign up before the expiration date, you must request a new code. · [email or web address] Access Code: [email or web address] Expires: 9/3/2018  8:58 AM 
 
4. Enter the last four digits of your Social Security Number (xxxx) and Date of Birth (mm/dd/yyyy) as indicated and click Submit. You will be taken to the next sign-up page. 5. Create a [email or web address] ID. This will be your [email or web address] login ID and cannot be changed, so think of one that is secure and easy to remember. 6. Create a [email or web address] password. You can change your password at any time. 7. Enter your Password Reset Question and Answer. This can be used at a later time if you forget your password. 8. Enter your e-mail address. You will receive e-mail notification when new information is available in [address] 9. Click Sign Up. You can now view and download portions of your medical record. 10. Click the Download Summary menu link to download a portable copy of your medical information. If you have questions, please visit the Frequently Asked Questions section of the [employer] website. Remember, [employer] is NOT to be used for urgent needs. For medical emergencies, dial 911. Now available from your iPhone and Android! Please provide this summary of care documentation to your next provider. Your primary care clinician is listed as Roque Malagon. If you have any questions after today's visit, please call [phone].

## 2018-08-15 NOTE — PROGRESS NOTES
1. Have you been to the ER, urgent care clinic since your last visit? Hospitalized since your last visit? No    2. Have you seen or consulted any other health care providers outside of the [address] [place] [address] since your last visit? Include any pap smears or colon screening.  No

## 2018-08-16 ENCOUNTER — TELEPHONE (OUTPATIENT)
Dept: INTERNAL MEDICINE CLINIC | Age: 58
End: 2018-08-16

## 2018-08-16 RX ORDER — CIPROFLOXACIN AND DEXAMETHASONE 3; 1 MG/ML; MG/ML
4 SUSPENSION/ DROPS AURICULAR (OTIC) 2 TIMES DAILY
Qty: 7.5 ML | Refills: 0 | Status: SHIPPED | OUTPATIENT
Start: 2018-08-16 | End: 2018-08-26

## 2018-08-16 NOTE — PROGRESS NOTES
Subjective:    Ms Cornelio Rockwell is a 62 YOF coming to the office today complaining of Left ear discharge x 3 days. This has never happened to her before and has stayed constant. Pt denies any pain associated with it. Nothing makes it better but lying on the affected side makes it worse. She describes the discharge as a yellow and odorous. She has been putting in an unknown \"oil drop\" in her ear as per her ENT. Ear makes a popping sound if she pinches her nose and blows out. ROS: denies fever, dizziness, CP, SOB, abd pn but does have a headache. Objective:    Vitals: /67, P 71, RR14, Temp 97.7, SpO2 98%  General: Well developed, well nourished adult female appearing stated age. A&Ox3  HEENT: sclera nonicteric. oral cavity clear, no erythema or exudates. Nares are clear, inferior turbinates are visible, no erythema or exudates. Bilateral ears are nontender on palpation. Visibly normal. Right ear has clear external canal, light reflex present on tympanic membrane. Left ear has possible tympanic membrane rupture, hole appears in the anterior inferior portion, exudates and dried blood in canal.  Cardiac: S1 and S2 normal, no gallops, rubs, clicks or murmurs  Pulm: CTA  Abd: bowel sounds present. Assessment:  Possible rupture L tympanic membrane    Plan: refer to ENT        *ATTENTION:  This note has been created by a medical student for educational purposes only. Please do not refer to the content of this note for clinical decision-making, billing, or other purposes. Please see attending physicians note to obtain clinical information on this patient. *

## 2018-08-16 NOTE — TELEPHONE ENCOUNTER
Script that was escribed yesterday needs to go to [facility], not to [facility]. [facility] only does refills now, not new RX'S. Please send to [facility] and call pt at [phone] once done.

## 2018-08-16 NOTE — TELEPHONE ENCOUNTER
Pt calling again. Says she needs today.  [facility], [address]

## 2018-08-25 NOTE — PROGRESS NOTES
[facility]:  8/25/2018, MRN: [id number]      Ab Grandchild is a 62 y.o. female and presents to clinic for Ear Fullness (left ear clogged and draining)    Subjective: The pt is a 71-year-old female with history of left breast adenocarcinoma (s/p lumpectomy and XRT in 2012), celiac disease, chronic constipation, and hypothyroidism. Left Otitis Externa: Present for 3 days. She reports yellow/white drainage out of her left ear. No tinnitus. No hearing deficits. No fever or chills. No sore throat, cough, shortness of breath, or eye pain. No headaches. She states that the drainage out of her left ear has a foul odor. Symptoms are made worse when she is lying down. No alleviating factors are known. Patient Active Problem List    Diagnosis Date Noted    Malignant neoplasm of left female breast ([facility] [address] - ductal adenocarcinoma 10/11/2017    Acquired hypothyroidism 10/11/2017    Celiac disease 10/11/2017       Current Outpatient Prescriptions   Medication Sig Dispense Refill    potassium 99 mg tablet Take 120 mg by mouth daily.  SYNTHROID 100 mcg tablet Take 1 Tab by mouth Daily (before breakfast). Indications: hypothyroidism 90 Tab 3    acetaminophen (TYLENOL ARTHRITIS PAIN) 650 mg CR tablet Take 650 mg by mouth every six (6) hours as needed for Pain.  ibuprofen (MOTRIN) 200 mg tablet Take 800 mg by mouth every six (6) hours as needed for Pain.  cholecalciferol (VITAMIN D3) 1,000 unit cap Take 2,000 Units by mouth daily.  biotin 5,000 mcg TbDi Take 5,000 mcg by mouth daily.  B.infantis-B.ani-B.long-B.bifi (PROBIOTIC 4X) 10-15 mg TbEC Take  by mouth daily.  magnesium 200 mg tab Take 400 mg by mouth daily.  ciprofloxacin-dexamethasone (CIPRODEX) 0.3-0.1 % otic suspension Administer 4 Drops in left ear two (2) times a day for 10 days. 7.5 mL 0    selenium 200 mcg cap Take 200 mg by mouth daily.          Allergies   Allergen Reactions    Mobic [Meloxicam] Other (comments)     Bloating and severe constipation    Other Medication Rash     Septra     Pcn [Penicillins] Rash    Penicillin G Rash    Septra [Sulfamethoprim] Rash    Sulfa (Sulfonamide Antibiotics) Other (comments)       Past Medical History:   Diagnosis Date    Arthritis     right hip    Cancer ([facility] [address] 12/20/2012    Left Breast    Headache     Thyroid disease     hypothyroid       Past Surgical History:   Procedure Laterality Date    HX APPENDECTOMY  2016    HX BREAST LUMPECTOMY  2013 2013    HX CARPAL TUNNEL RELEASE  2014    right    HX CYST REMOVAL  1986    behind left knee    HX TYMPANOSTOMY  1986    bilateral    HX WISDOM TEETH EXTRACTION      x4       Family History   Problem Relation Age of Onset    Cancer Mother      colon,liver    Hypertension Mother     Heart Disease Father     Heart Attack Father     No Known Problems Brother     No Known Problems Maternal Grandmother     No Known Problems Maternal Grandfather     No Known Problems Paternal Grandmother     No Known Problems Paternal Grandfather     No Known Problems Son     Diabetes Daughter     Thyroid Disease Daughter        Social History   Substance Use Topics    Smoking status: Never Smoker    Smokeless tobacco: Never Used    Alcohol use No       ROS   Review of Systems   Constitutional: Negative for chills and fever. HENT: Negative for ear pain, hearing loss, sore throat and tinnitus. Eyes: Negative for blurred vision and pain. Respiratory: Negative for cough and shortness of breath. Cardiovascular: Negative for chest pain. Gastrointestinal: Negative for abdominal pain, blood in stool and melena. Genitourinary: Negative for dysuria and hematuria. Musculoskeletal: Negative for joint pain and myalgias. Skin: Negative for rash. Neurological: Negative for tingling, focal weakness and headaches. Endo/Heme/Allergies: Does not bruise/bleed easily. Psychiatric/Behavioral: Negative for substance abuse. Objective     Vitals:    08/15/18 1343   BP: 124/67   Pulse: 71   Resp: 14   Temp: 97.7 °F (36.5 °C)   SpO2: 98%   Weight: 134 lb (60.8 kg)   Height: 5' 2\" (1.575 m)   PainSc:   0 - No pain       Physical Exam   Constitutional: She is oriented to person, place, and time and well-developed, well-nourished, and in no distress. HENT:   Head: Normocephalic and atraumatic. Right Ear: External ear normal.   Nose: Nose normal.   Mouth/Throat: Oropharynx is clear and moist. No oropharyngeal exudate. There is a slightly erythematous area along her left external auditory canal with white drainage present. There is also minimal cerumen present. Her TM is clear. Eyes: Conjunctivae and EOM are normal. Pupils are equal, round, and reactive to light. Right eye exhibits no discharge. Left eye exhibits no discharge. No scleral icterus. Neck: Neck supple. Cardiovascular: Normal rate, regular rhythm, normal heart sounds and intact distal pulses. Exam reveals no gallop and no friction rub. No murmur heard. Pulmonary/Chest: Effort normal and breath sounds normal. No respiratory distress. She has no wheezes. She has no rales. Abdominal: Soft. Bowel sounds are normal. She exhibits no distension. There is no tenderness. There is no rebound and no guarding. Musculoskeletal: She exhibits no edema or tenderness (BUE). Lymphadenopathy:     She has no cervical adenopathy. Neurological: She is alert and oriented to person, place, and time. She exhibits normal muscle tone. Gait normal.   Skin: Skin is warm and dry. No erythema. Psychiatric: Affect normal.   Nursing note and vitals reviewed.       LABS   Data Review:   Lab Results   Component Value Date/Time    WBC 4.5 06/25/2018 11:39 AM    HGB 13.0 06/25/2018 11:39 AM    HCT 38.3 06/25/2018 11:39 AM    PLATELET 158 77/81/2814 11:39 AM    MCV 97 06/25/2018 11:39 AM       Lab Results   Component Value Date/Time    Sodium 142 06/25/2018 11:39 AM    Potassium 4.2 06/25/2018 11:39 AM    Chloride 104 06/25/2018 11:39 AM    CO2 25 06/25/2018 11:39 AM    Anion gap 6 12/21/2017 12:45 PM    Glucose 86 06/25/2018 11:39 AM    BUN 11 06/25/2018 11:39 AM    Creatinine 0.62 06/25/2018 11:39 AM    BUN/Creatinine ratio 18 06/25/2018 11:39 AM    GFR est  06/25/2018 11:39 AM    GFR est non- 06/25/2018 11:39 AM    Calcium 9.5 06/25/2018 11:39 AM       Lab Results   Component Value Date/Time    Cholesterol, total 159 10/11/2017 04:34 PM    HDL Cholesterol 71 (H) 10/11/2017 04:34 PM    LDL, calculated 43.8 10/11/2017 04:34 PM    VLDL, calculated 44.2 10/11/2017 04:34 PM    Triglyceride 221 (H) 10/11/2017 04:34 PM    CHOL/HDL Ratio 2.2 10/11/2017 04:34 PM       Assessment/Plan:   Otitis externa of left ear: Per PE findings and hx.  - Ciprodex rx prescribed. RTC if sx worsen. Health Maintenance Due   Topic Date Due    Pneumococcal 19-64 Highest Risk (1 of 3 - PCV13) 12/20/1979    DTaP/Tdap/Td series (1 - Tdap) 12/20/1981    FOBT Q 1 YEAR AGE 50-75  12/20/2010    Influenza Age 9 to Adult  08/01/2018     Lab review: labs are reviewed in the EHR    I have discussed the diagnosis with the patient and the intended plan as seen in the above orders. The patient has received an after-visit summary and questions were answered concerning future plans. I have discussed medication side effects and warnings with the patient as well. I have reviewed the plan of care with the patient, accepted their input and they are in agreement with the treatment goals. All questions were answered. The patient understands the plan of care. Handouts provided today with above information. Pt instructed if symptoms worsen to call the office or report to the ED for continued care. Greater than 50% of the visit time was spent in counseling and/or coordination of care. Voice recognition was used to generate this report, which may have resulted in some phonetic based errors in grammar and contents. Even though attempts were made to correct all the mistakes, some may have been missed, and remained in the body of the document.     Cecelia Thomas MD

## 2018-08-28 ENCOUNTER — OFFICE VISIT (OUTPATIENT)
Dept: SURGERY | Age: 58
End: 2018-08-28

## 2018-08-28 VITALS
HEIGHT: 62 IN | TEMPERATURE: 98 F | DIASTOLIC BLOOD PRESSURE: 66 MMHG | BODY MASS INDEX: 24.48 KG/M2 | WEIGHT: 133 LBS | SYSTOLIC BLOOD PRESSURE: 110 MMHG | RESPIRATION RATE: 18 BRPM | HEART RATE: 68 BPM

## 2018-08-28 DIAGNOSIS — Z12.11 ENCOUNTER FOR SCREENING COLONOSCOPY: Primary | ICD-10-CM

## 2018-08-28 RX ORDER — NAPROXEN SODIUM 220 MG
220 TABLET ORAL 2 TIMES DAILY WITH MEALS
COMMUNITY
End: 2019-01-24

## 2018-08-28 NOTE — MR AVS SNAPSHOT
1017 [facility] 240 200 [facility] 
[phone] Patient: Benson Lilly 
MRN: [id number] JESSIE:[id number] Visit Information Date & Time Provider Department Dept. Phone Encounter #  
 8/28/2018  2:00 PM TSS [facility] NURSE VISIT [facility] [phone] [id number] Your Appointments 9/5/2018  2:00 PM  
Office Visit with Arlene Prieto MD  
Internists of [address] [place] [address]) Appt Note: 2 week follow up; ov  
 [address] [facility], [address] [facility] [address] [place]  
  
   
 [address], [address]  
  
    
 11/28/2018  8:25 AM  
LAB with [facility] NURSE VISIT Internists of [address] [place] [address] ([address]) Appt Note: lab  
 [address], [address] [facility] [address] [place]  
  
   
 [address], [address]  
  
    
 12/5/2018 10:00 AM  
Office Visit with Arlene Prieto MD  
Internists of [address] [place] [address] [address] [place] [address]) Appt Note: ov 6mos. [facility] [address], [address] [facility]  
[phone] Upcoming Health Maintenance Date Due Pneumococcal 19-64 Highest Risk (1 of 3 - PCV13) 12/20/1979 DTaP/Tdap/Td series (1 - Tdap) 12/20/1981 FOBT Q 1 YEAR AGE 50-75 12/20/2010 Influenza Age 5 to Adult 8/1/2018 BREAST CANCER SCRN MAMMOGRAM 1/30/2020 PAP AKA CERVICAL CYTOLOGY 11/14/2020 Allergies as of 8/28/2018  Review Complete On: 8/28/2018 By: Marcel Malagon RN Severity Noted Reaction Type Reactions Mobic [Meloxicam] High 06/05/2018    Other (comments) Bloating and severe constipation Other Medication High   Rash Septra Pcn [Penicillins] High 10/11/2017    Rash Penicillin G High   Rash Septra [Sulfamethoprim] High 10/11/2017    Rash  
 Sulfa (Sulfonamide Antibiotics) High   Other (comments) Current Immunizations  Never Reviewed Name Date  
 TB Skin Test (PPD) Intradermal 6/25/2018 Not reviewed this visit Vitals BP Pulse Temp Resp Height(growth percentile) Weight(growth percentile) 110/66 68 98 °F (36.7 °C) 18 5' 2\" (1.575 m) 133 lb (60.3 kg) BMI OB Status Smoking Status 24.33 kg/m2 Menopause Never Smoker BMI and BSA Data Body Mass Index Body Surface Area  
 24.33 kg/m 2 1.62 m 2 Preferred Pharmacy Pharmacy Name Phone [facility] [phone] Your Updated Medication List  
  
   
This list is accurate as of 8/28/18  2:58 PM.  Always use your most recent med list.  
  
  
  
  
 biotin 5,000 mcg Tbdi Take 5,000 mcg by mouth daily. ibuprofen 200 mg tablet Commonly known as:  MOTRIN Take 800 mg by mouth every six (6) hours as needed for Pain.  
  
 magnesium 200 mg Tab Take 400 mg by mouth daily. naproxen sodium 220 mg tablet Commonly known as:  NAPROSYN Take 220 mg by mouth two (2) times daily (with meals). potassium 99 mg tablet Take 120 mg by mouth daily. PROBIOTIC 4X 10-15 mg Tbec Generic drug:  B.infantis-B.ani-B.long-B.bifi Take  by mouth daily. selenium 200 mcg Cap Take 200 mg by mouth daily. SYNTHROID 100 mcg tablet Generic drug:  levothyroxine Take 1 Tab by mouth Daily (before breakfast). Indications: hypothyroidism TYLENOL ARTHRITIS PAIN 650 mg Sherrlyn Kelvin Generic drug:  acetaminophen Take 650 mg by mouth every six (6) hours as needed for Pain. VITAMIN D3 1,000 unit Cap Generic drug:  cholecalciferol Take 2,000 Units by mouth daily. Introducing [facility]! [facility] introduces Zenamins patient portal. Now you can access parts of your medical record, email your doctor's office, and request medication refills online. 1. In your internet browser, go to [email or web address] 2. Click on the First Time User? Click Here link in the Sign In box. You will see the New Member Sign Up page. 3. Enter your RCT Logic Access Code exactly as it appears below. You will not need to use this code after youve completed the sign-up process. If you do not sign up before the expiration date, you must request a new code. · [employer] Logic Access Code: [email or web address] Expires: 9/3/2018  8:58 AM 
 
4. Enter the last four digits of your Social Security Number (xxxx) and Date of Birth (mm/dd/yyyy) as indicated and click Submit. You will be taken to the next sign-up page. 5. Create a RCT Logic ID. This will be your RCT Logic login ID and cannot be changed, so think of one that is secure and easy to remember. 6. Create a [employer] Logic password. You can change your password at any time. 7. Enter your Password Reset Question and Answer. This can be used at a later time if you forget your password. 8. Enter your e-mail address. You will receive e-mail notification when new information is available in [address] 9. Click Sign Up. You can now view and download portions of your medical record. 10. Click the Download Summary menu link to download a portable copy of your medical information. If you have questions, please visit the Frequently Asked Questions section of the RCT Logic website. Remember, [employer] Logic is NOT to be used for urgent needs. For medical emergencies, dial 911. Now available from your iPhone and Android! Please provide this summary of care documentation to your next provider. Your primary care clinician is listed as Leola Olvera. If you have any questions after today's visit, please call [phone].

## 2018-08-28 NOTE — PROGRESS NOTES
Colon Screen    Patient: Betty Garcia MRN: [id number]  SSN: [id number]    YOB: 1960  Age: 62 y.o. Sex: female        Subjective:   Betty Garcia was referred by Dr. Sebastian ref. provider found. PCP is Sheldon Pimentel MD.  Patient referred for colonoscopy for   Screening colonoscopy. Patient denies rectal pain or bleeding. Abdominal surgeries as described below, specifically none  Family history as described below, specifically mother. Last colonoscopy was 4-5 years ago.     Allergies   Allergen Reactions    Mobic [Meloxicam] Other (comments)     Bloating and severe constipation    Other Medication Rash     Septra     Pcn [Penicillins] Rash    Penicillin G Rash    Septra [Sulfamethoprim] Rash    Sulfa (Sulfonamide Antibiotics) Other (comments)       Past Medical History:   Diagnosis Date    Arthritis     right hip    Cancer ([facility] [address] 12/20/2012    Left Breast    Headache     Thyroid disease     hypothyroid     Past Surgical History:   Procedure Laterality Date    HX APPENDECTOMY  2016    HX BREAST LUMPECTOMY  2013 2013    HX CARPAL TUNNEL RELEASE  2014    right    HX CYST REMOVAL  1986    behind left knee    HX HEENT  1986    bilateral tubes    HX ORTHOPAEDIC      carpal tunnel right    HX WISDOM TEETH EXTRACTION      x4      Family History   Problem Relation Age of Onset    Cancer Mother      colon,liver    Hypertension Mother     Heart Disease Father     Heart Attack Father     No Known Problems Brother     No Known Problems Maternal Grandmother     No Known Problems Maternal Grandfather     No Known Problems Paternal Grandmother     No Known Problems Paternal Grandfather     No Known Problems Son     Diabetes Daughter     Thyroid Disease Daughter      Social History   Substance Use Topics    Smoking status: Never Smoker    Smokeless tobacco: Never Used    Alcohol use Yes      Comment: rare      Prior to Admission medications    Medication Sig Start Date End Date Taking? Authorizing Provider   naproxen sodium (NAPROSYN) 220 mg tablet Take 220 mg by mouth two (2) times daily (with meals). Yes Historical Provider   potassium 99 mg tablet Take 120 mg by mouth daily. Yes Historical Provider   SYNTHROID 100 mcg tablet Take 1 Tab by mouth Daily (before breakfast). Indications: hypothyroidism 10/11/17  Yes Ziggy Mckeon MD   acetaminophen (TYLENOL ARTHRITIS PAIN) 650 mg CR tablet Take 650 mg by mouth every six (6) hours as needed for Pain. Yes Historical Provider   ibuprofen (MOTRIN) 200 mg tablet Take 800 mg by mouth every six (6) hours as needed for Pain. Yes Historical Provider   cholecalciferol (VITAMIN D3) 1,000 unit cap Take 2,000 Units by mouth daily. Yes Historical Provider   biotin 5,000 mcg TbDi Take 5,000 mcg by mouth daily. Yes Historical Provider   magnesium 200 mg tab Take 400 mg by mouth daily. Yes Historical Provider   selenium 200 mcg cap Take 200 mg by mouth daily. Historical Provider   B.infantis-B.ani-B.long-B.bifi (PROBIOTIC 4X) 10-15 mg TbEC Take  by mouth daily. Historical Provider          Review of Systems: Review of Systems   Constitutional: Positive for malaise/fatigue. Negative for chills, diaphoresis, fever and weight loss. HENT: Positive for tinnitus. Negative for congestion, ear discharge, ear pain, hearing loss, nosebleeds, sinus pain and sore throat. Eyes: Negative. Respiratory: Negative. Negative for stridor. Cardiovascular: Negative. Gastrointestinal: Positive for constipation. Negative for abdominal pain, blood in stool, diarrhea, heartburn, melena, nausea and vomiting. Genitourinary: Negative. Musculoskeletal: Positive for joint pain. Negative for back pain, falls, myalgias and neck pain. Skin: Negative. Neurological: Positive for weakness. Negative for dizziness, tingling, tremors, sensory change, speech change, focal weakness, seizures, loss of consciousness and headaches.    Endo/Heme/Allergies: Negative for environmental allergies and polydipsia. Bruises/bleeds easily. Psychiatric/Behavioral: Negative.       Risks colonoscopy described- colon injury, missed lesion, anesthesia problems, bleeding       Harvey Jacob RN  August 28, 2018  2:55 PM

## 2018-08-31 ENCOUNTER — OFFICE VISIT (OUTPATIENT)
Dept: INTERNAL MEDICINE CLINIC | Age: 58
End: 2018-08-31

## 2018-08-31 VITALS
BODY MASS INDEX: 24.66 KG/M2 | WEIGHT: 134 LBS | OXYGEN SATURATION: 100 % | SYSTOLIC BLOOD PRESSURE: 103 MMHG | RESPIRATION RATE: 12 BRPM | HEART RATE: 69 BPM | DIASTOLIC BLOOD PRESSURE: 72 MMHG | HEIGHT: 62 IN | TEMPERATURE: 98.8 F

## 2018-08-31 DIAGNOSIS — H66.92 LEFT OTITIS MEDIA, UNSPECIFIED OTITIS MEDIA TYPE: Primary | ICD-10-CM

## 2018-08-31 DIAGNOSIS — M25.559 ARTHRALGIA OF HIP, UNSPECIFIED LATERALITY: ICD-10-CM

## 2018-08-31 RX ORDER — LEVOTHYROXINE SODIUM 100 UG/1
100 TABLET ORAL
COMMUNITY
End: 2018-10-03 | Stop reason: SDUPTHER

## 2018-08-31 RX ORDER — AZITHROMYCIN 250 MG/1
250 TABLET, FILM COATED ORAL SEE ADMIN INSTRUCTIONS
COMMUNITY
Start: 2018-08-29 | End: 2018-12-05 | Stop reason: ALTCHOICE

## 2018-08-31 NOTE — MR AVS SNAPSHOT
[facility] 
 
 
 [address], [address] [facility] 
[phone] Patient: Kelvin Chase 
MRN: [id number] Visit Information Date & Time Provider Department Dept. Phone Encounter #  
 8/31/2018 12:00 PM Nasra Poole MD [facility]  Follow-up Instructions Return if symptoms worsen or fail to improve. Your Appointments 11/28/2018  8:25 AM  
LAB with [facility] NURSE VISIT [facility] Appt Note: lab  
 [address], [address] [place] [address] [address] [place]  
  
   
 [address], [address]  
  
    
 12/5/2018 10:00 AM  
Office Visit with Nasra Poole MD  
[facility]) Appt Note: ov 6mos. [employer] [address], [address] [place]  
  
   
 [address], [address] Upcoming Health Maintenance Date Due Pneumococcal 19-64 Highest Risk (1 of 3 - PCV13) 12/20/1979 DTaP/Tdap/Td series (1 - Tdap) 12/20/1981 FOBT Q 1 YEAR AGE 50-75 12/20/2010 Influenza Age 5 to Adult 8/1/2018 BREAST CANCER SCRN MAMMOGRAM 1/30/2020 PAP AKA CERVICAL CYTOLOGY 11/14/2020 Allergies as of 8/31/2018  Review Complete On: 8/28/2018 By: Venkat Saenz RN Severity Noted Reaction Type Reactions Mobic [Meloxicam] High 06/05/2018    Other (comments) Bloating and severe constipation Other Medication High   Rash Septra Pcn [Penicillins] High 10/11/2017    Rash Penicillin G High   Rash Septra [Sulfamethoprim] High 10/11/2017    Rash  
 Sulfa (Sulfonamide Antibiotics) High   Other (comments) Current Immunizations  Never Reviewed Name Date  
 TB Skin Test (PPD) Intradermal 6/25/2018 Not reviewed this visit Vitals BP Pulse Temp Resp Height(growth percentile) Weight(growth percentile) 103/72 (BP 1 Location: Left arm, BP Patient Position: Sitting) 69 98.8 °F (37.1 °C) (Oral) 12 5' 2\" (1.575 m) 134 lb (60.8 kg) SpO2 BMI OB Status Smoking Status 100% 24.51 kg/m2 Menopause Never Smoker BMI and BSA Data Body Mass Index Body Surface Area 24.51 kg/m 2 1.63 m 2 Preferred Pharmacy Pharmacy Name Phone [facility] [phone] Your Updated Medication List  
  
   
This list is accurate as of 8/31/18 12:55 PM.  Always use your most recent med list.  
  
  
  
  
 azithromycin 250 mg tablet Commonly known as:  Eward Malu Take 250 mg by mouth See Admin Instructions. biotin 5,000 mcg Tbdi Take 5,000 mcg by mouth daily. ibuprofen 200 mg tablet Commonly known as:  MOTRIN Take 800 mg by mouth every six (6) hours as needed for Pain.  
  
 levothyroxine 100 mcg tablet Commonly known as:  SYNTHROID Take 100 mcg by mouth Daily (before breakfast). magnesium 200 mg Tab Take 400 mg by mouth daily. naproxen sodium 220 mg tablet Commonly known as:  NAPROSYN Take 220 mg by mouth two (2) times daily (with meals). potassium 99 mg tablet Take 120 mg by mouth daily. PROBIOTIC 4X 10-15 mg Tbec Generic drug:  B.infantis-B.ani-B.long-B.bifi Take  by mouth daily. TYLENOL ARTHRITIS PAIN 650 mg Joanna Gautam Generic drug:  acetaminophen Take 650 mg by mouth every six (6) hours as needed for Pain. VITAMIN D3 1,000 unit Cap Generic drug:  cholecalciferol Take 2,000 Units by mouth daily. Follow-up Instructions Return if symptoms worsen or fail to improve. Patient Instructions Health Maintenance Due Topic Date Due  Pneumococcal 19-64 Highest Risk (1 of 3 - PCV13) 12/20/1979  
 DTaP/Tdap/Td series (1 - Tdap) 12/20/1981  
 FOBT Q 1 YEAR AGE 50-75  12/20/2010  Influenza Age 5 to Adult  08/01/2018 Swimmer's Ear: Care Instructions Your Care Instructions Swimmer's ear (otitis externa) is inflammation or infection of the ear canal. This is the passage that leads from the outer ear to the eardrum. Any water, sand, or other debris that gets into the ear canal and stays there can cause swimmer's ear. Putting cotton swabs or other items in the ear to clean it can also cause this problem. Swimmer's ear can be very painful. But you can treat the pain and infection with medicines. You should feel better in a few days. Follow-up care is a key part of your treatment and safety. Be sure to make and go to all appointments, and call your doctor if you are having problems. It's also a good idea to know your test results and keep a list of the medicines you take. How can you care for yourself at home? Cleaning and care · Use antibiotic drops as your doctor directs. · Do not insert ear drops (other than the antibiotic ear drops) or anything else into the ear unless your doctor has told you to. · Avoid getting water in the ear until the problem clears up. Use cotton lightly coated with petroleum jelly as an earplug. Do not use plastic earplugs. · Use a hair dryer set on low to carefully dry the ear after you shower. · To ease ear pain, hold a warm washcloth against your ear. · Take pain medicines exactly as directed. ¨ If the doctor gave you a prescription medicine for pain, take it as prescribed. ¨ If you are not taking a prescription pain medicine, ask your doctor if you can take an over-the-counter medicine. Inserting ear drops · Warm the drops to body temperature by rolling the container in your hands. Or you can place it in a cup of warm water for a few minutes. · Lie down, with your ear facing up. · Place drops inside the ear. Follow your doctor's instructions (or the directions on the label) for how many drops to use.  Gently wiggle the outer ear or pull the ear up and back to help the drops get into the ear. · It's important to keep the liquid in the ear canal for 3 to 5 minutes. When should you call for help? Call your doctor now or seek immediate medical care if: 
  · You have a new or higher fever.  
  · You have new or worse pain, swelling, warmth, or redness around or behind your ear.  
  · You have new or increasing pus or blood draining from your ear.  
 Watch closely for changes in your health, and be sure to contact your doctor if: 
  · You are not getting better after 2 days (48 hours). Where can you learn more? Go to http://shamir-majo.info/. Enter C706 in the search box to learn more about \"Swimmer's Ear: Care Instructions. \" Current as of: May 12, 2017 Content Version: 11.7 © 3959-9529 MedArkive. Care instructions adapted under license by [facility] (which disclaims liability or warranty for this information). If you have questions about a medical condition or this instruction, always ask your healthcare professional. [place] [address] any warranty or liability for your use of this information. Introducing [facility]! [facility] introduces ClicData patient portal. Now you can access parts of your medical record, email your doctor's office, and request medication refills online. 1. In your internet browser, go to [email or web address] 2. Click on the First Time User? Click Here link in the Sign In box. You will see the New Member Sign Up page. 3. Enter your ClicData Access Code exactly as it appears below. You will not need to use this code after youve completed the sign-up process. If you do not sign up before the expiration date, you must request a new code. · ClicData Access Code: [email or web address] Expires: 9/3/2018  8:58 AM 
 
4.  Enter the last four digits of your Social Security Number (xxxx) and Date of Birth (mm/dd/yyyy) as indicated and click Submit. You will be taken to the next sign-up page. 5. Create a Zazuba ID. This will be your Zazuba login ID and cannot be changed, so think of one that is secure and easy to remember. 6. Create a Zazuba password. You can change your password at any time. 7. Enter your Password Reset Question and Answer. This can be used at a later time if you forget your password. 8. Enter your e-mail address. You will receive e-mail notification when new information is available in [address] 9. Click Sign Up. You can now view and download portions of your medical record. 10. Click the Download Summary menu link to download a portable copy of your medical information. If you have questions, please visit the Frequently Asked Questions section of the Zazuba website. Remember, Zazuba is NOT to be used for urgent needs. For medical emergencies, dial 911. Now available from your iPhone and Android! Please provide this summary of care documentation to your next provider. Your primary care clinician is listed as Tucker Boyle. If you have any questions after today's visit, please call [phone].

## 2018-08-31 NOTE — PROGRESS NOTES
Chief Complaint   Patient presents with    Ear Fullness     patient made her own appointment with ENT Dr Anson Atkinson in [place] on 8-28-18 and would like to discuss     1. Have you been to the ER, urgent care clinic since your last visit? Hospitalized since your last visit? No    2. Have you seen or consulted any other health care providers outside of the [address] [place] [address] since your last visit? Include any pap smears or colon screening.  No

## 2018-08-31 NOTE — PATIENT INSTRUCTIONS
Health Maintenance Due   Topic Date Due    Pneumococcal 19-64 Highest Risk (1 of 3 - PCV13) 12/20/1979    DTaP/Tdap/Td series (1 - Tdap) 12/20/1981    FOBT Q 1 YEAR AGE 50-75  12/20/2010    Influenza Age 9 to Adult  08/01/2018          Swimmer's Ear: Care Instructions  Your Care Instructions    Swimmer's ear (otitis externa) is inflammation or infection of the ear canal. This is the passage that leads from the outer ear to the eardrum. Any water, sand, or other debris that gets into the ear canal and stays there can cause swimmer's ear. Putting cotton swabs or other items in the ear to clean it can also cause this problem. Swimmer's ear can be very painful. But you can treat the pain and infection with medicines. You should feel better in a few days. Follow-up care is a key part of your treatment and safety. Be sure to make and go to all appointments, and call your doctor if you are having problems. It's also a good idea to know your test results and keep a list of the medicines you take. How can you care for yourself at home? Cleaning and care  · Use antibiotic drops as your doctor directs. · Do not insert ear drops (other than the antibiotic ear drops) or anything else into the ear unless your doctor has told you to. · Avoid getting water in the ear until the problem clears up. Use cotton lightly coated with petroleum jelly as an earplug. Do not use plastic earplugs. · Use a hair dryer set on low to carefully dry the ear after you shower. · To ease ear pain, hold a warm washcloth against your ear. · Take pain medicines exactly as directed. ¨ If the doctor gave you a prescription medicine for pain, take it as prescribed. ¨ If you are not taking a prescription pain medicine, ask your doctor if you can take an over-the-counter medicine. Inserting ear drops  · Warm the drops to body temperature by rolling the container in your hands. Or you can place it in a cup of warm water for a few minutes.   · Allyne Check down, with your ear facing up. · Place drops inside the ear. Follow your doctor's instructions (or the directions on the label) for how many drops to use. Gently wiggle the outer ear or pull the ear up and back to help the drops get into the ear. · It's important to keep the liquid in the ear canal for 3 to 5 minutes. When should you call for help? Call your doctor now or seek immediate medical care if:    · You have a new or higher fever.     · You have new or worse pain, swelling, warmth, or redness around or behind your ear.     · You have new or increasing pus or blood draining from your ear.    Watch closely for changes in your health, and be sure to contact your doctor if:    · You are not getting better after 2 days (48 hours). Where can you learn more? Go to http://shamir-majo.info/. Enter C706 in the search box to learn more about \"Swimmer's Ear: Care Instructions. \"  Current as of: May 12, 2017  Content Version: 11.7  © 1208-3017 gBox. Care instructions adapted under license by [employer] (which disclaims liability or warranty for this information). If you have questions about a medical condition or this instruction, always ask your healthcare professional. [address] any warranty or liability for your use of this information.

## 2018-08-31 NOTE — ACP (ADVANCE CARE PLANNING)
Advance Care Planning (ACP) Provider Conversation Snapshot    Date of ACP Conversation: 08/31/18  Persons included in Conversation:  patient  Length of ACP Conversation in minutes:  <16 minutes (Non-Billable)    Authorized Decision Maker (if patient is incapable of making informed decisions): This person is:   Healthcare Agent/Medical Power of  under Advance Directive          For Patients with Decision Making Capacity:   Values/Goals: Exploration of values, goals, and preferences if recovery is not expected, even with continued medical treatment in the event of:  Imminent death  Severe, permanent brain injury    Conversation Outcomes / Follow-Up Plan:   Reviewed existing Advance Directive . She has no changes to make to her preexisting advance directive. The contact info for her POAs are unchanged.       Dr. Daniel Rodriguez  [facility] [address]  Phone: [phone]  Fax: [phone]

## 2018-08-31 NOTE — PROGRESS NOTES
INTERNISTS OF [facility]: 
8/31/2018, MRN: [id number] Jennifer Pack is a 62 y.o. female and presents to clinic *** Ear Fullness (patient made her own appointment with ENT Dr Calixto Mcdaniel in [place] on 8-28-18 and would like to discuss) Subjective:  
*** 
 
ENT - saw - \"hole in her ear drum. \" Placed on azithromycin and special ear drops. No relief with the otic rx I prescribed. She is scheduled for audiometry 9/19/18 No ear pain. Left Ear: *** Right hip pain - wants replacement Patient Active Problem List  
 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  levothyroxine (SYNTHROID) 100 mcg tablet Take 100 mcg by mouth Daily (before breakfast).  naproxen sodium (NAPROSYN) 220 mg tablet Take 220 mg by mouth two (2) times daily (with meals).  potassium 99 mg tablet Take 120 mg by mouth daily.  acetaminophen (TYLENOL ARTHRITIS PAIN) 650 mg CR tablet Take 650 mg by mouth every six (6) hours as needed for Pain.  ibuprofen (MOTRIN) 200 mg tablet Take 800 mg by mouth every six (6) hours as needed for Pain.  cholecalciferol (VITAMIN D3) 1,000 unit cap Take 2,000 Units by mouth daily.  biotin 5,000 mcg TbDi Take 5,000 mcg by mouth daily.  B.infantis-B.ani-B.long-B.bifi (PROBIOTIC 4X) 10-15 mg TbEC Take  by mouth daily.  magnesium 200 mg tab Take 400 mg by mouth daily. Allergies Allergen Reactions  Mobic [Meloxicam] Other (comments) Bloating and severe constipation  Other Medication Rash Septra  Pcn [Penicillins] Rash  Penicillin G Rash  Septra [Sulfamethoprim] Rash  Sulfa (Sulfonamide Antibiotics) Other (comments) Past Medical History:  
Diagnosis Date  Arthritis   
 right hip  Cancer ([address] 12/20/2012 Left Breast  
 Headache  Thyroid disease   
 hypothyroid Past Surgical History:  
Procedure Laterality Date  HX APPENDECTOMY  2016  HX BREAST LUMPECTOMY  2013 2013  HX CARPAL TUNNEL RELEASE  2014  
 right  HX CYST REMOVAL  1986  
 behind left knee  HX HEENT  1986  
 bilateral tubes  HX ORTHOPAEDIC    
 carpal tunnel right  HX WISDOM TEETH EXTRACTION    
 x4 Family History Problem Relation Age of Onset  Cancer Mother   
  colon,liver  Hypertension Mother  Heart Disease Father  Heart Attack Father  No Known Problems Brother  No Known Problems Maternal Grandmother  No Known Problems Maternal Grandfather  No Known Problems Paternal Grandmother  No Known Problems Paternal Grandfather  No Known Problems Son  Diabetes Daughter  Thyroid Disease Daughter Social History Substance Use Topics  Smoking status: Never Smoker  Smokeless tobacco: Never Used  Alcohol use Yes Comment: rare ROS  
ROS Objective Vitals:  
 08/31/18 1212 BP: 103/72 Pulse: 69 Resp: 12 Temp: 98.8 °F (37.1 °C) TempSrc: Oral  
SpO2: 100% Weight: 134 lb (60.8 kg) Height: 5' 2\" (1.575 m) PainSc:   0 - No pain PainLoc: Ear Physical Exam 
 
LABS Data Review:  
Lab Results Component Value Date/Time WBC 4.5 06/25/2018 11:39 AM  
 HGB 13.0 06/25/2018 11:39 AM  
 HCT 38.3 06/25/2018 11:39 AM  
 PLATELET 084 60/63/8393 11:39 AM  
 MCV 97 06/25/2018 11:39 AM  
 
 
Lab Results Component Value Date/Time  Sodium 142 06/25/2018 11:39 AM  
 Potassium 4.2 06/25/2018 11:39 AM  
 Chloride 104 06/25/2018 11:39 AM  
 CO2 25 06/25/2018 11:39 AM  
 Anion gap 6 12/21/2017 12:45 PM  
 Glucose 86 06/25/2018 11:39 AM  
 BUN 11 06/25/2018 11:39 AM  
 Creatinine 0.62 06/25/2018 11:39 AM  
 BUN/Creatinine ratio 18 2018 11:39 AM  
 GFR est  2018 11:39 AM  
 GFR est non- 2018 11:39 AM  
 Calcium 9.5 2018 11:39 AM  
 
 
Lab Results Component Value Date/Time Cholesterol, total 159 10/11/2017 04:34 PM  
 HDL Cholesterol 71 (H) 10/11/2017 04:34 PM  
 LDL, calculated 43.8 10/11/2017 04:34 PM  
 VLDL, calculated 44.2 10/11/2017 04:34 PM  
 Triglyceride 221 (H) 10/11/2017 04:34 PM  
 CHOL/HDL Ratio 2.2 10/11/2017 04:34 PM  
 
 
No results found for: HBA1C, HGBE8, DJB9DVXM, PTV3ZAPW Assessment/Plan:  
There are no diagnoses linked to this encounter. Health Maintenance Due Topic Date Due  Pneumococcal 19-64 Highest Risk (1 of 3 - PCV13) 1979  
 DTaP/Tdap/Td series (1 - Tdap) 1981  
 FOBT Q 1 YEAR AGE 50-75  2010  Influenza Age 5 to Adult  2018 Lab review: {lab reviewed:[id number]} I have discussed the diagnosis with the patient and the intended plan as seen in the above orders. The patient has received an after-visit summary and questions were answered concerning future plans. I have discussed medication side effects and warnings with the patient as well. I have reviewed the plan of care with the patient, accepted their input and they are in agreement with the treatment goals. All questions were answered. The patient understands the plan of care. Handouts provided today with above information. ***Pt instructed if symptoms worsen to call the office or report to the ED for continued care. ***Greater than 50% of the visit time was spent in counseling and/or coordination of care. ***The patient was counseled on the dangers of tobacco use, and was {MU SMOKING CESSATION COUNSELIN::\"advised to quit\"}. Reviewed strategies to maximize success, including {techniques:}. 3-10 minutes were spent on smoking/tobacco cessation Voice recognition was used to generate this report, which may have resulted in some phonetic based errors in grammar and contents. Even though attempts were made to correct all the mistakes, some may have been missed, and remained in the body of the document. Follow-up Disposition: Not on File Nasra Poole MD

## 2018-08-31 NOTE — PROGRESS NOTES
INTERNISTS OF [facility]:   Preoperative Evaluation    Date of Exam: 08/31/18    MRN: Gene Jaimes  Is a 62 y.o.  female  who presents for preoperative evaluation and management of left otitis media. Chief Complaint   Patient presents with    Ear Fullness     patient made her own appointment with ENT Dr Parker Reynaga in [place] on 8-28-18 and would like to discuss       Subjective: The pt is a 60-year-old female with history of left breast adenocarcinoma (s/p lumpectomy and XRT in 2012), celiac disease, chronic constipation, and hypothyroidism. 1.  Left Otitis Media/Externa: At her last appointment, she reported a 3 day history of drainage out of her left ear. She denied hearing deficits, ear pain, fever, chills, or tinnitus. Given her physical exam findings, she was treated for left otitis externa with Ciprodex drops. She reports no adverse side effects with use of this Rx. Symptoms persisted though. She subsequently made an appointment to see an ENT doctor. She was told that she has a \"hole in her left ear drum. \"  She was placed on azithromycin and special eardrops. She does not know the name of the special eardrops he was also prescribed by ENT. She denies symptoms today since completing the Rx prescriptions. No ear pain or hearing loss. She is scheduled for formal hearing testing on September 19, 2018. 2.  Right Hip Pain: Worsening. Present for several months. Pain is brought on by certain movements. Pain is localized to her right hip. She hopes to have her right hip replacement surgery soon. She is asking to be clear for surgery. General Information:  Procedure/Surgery: right hip replacement  Date of Procedure/Surgery: to be determined  Primary Physician: Cate Stover MD  Surgery status: Elective  Surgery risk: Intermediate (head/neck, intraperitoneal, intrathoracic, orthopedic, and prostate    Cardiovascular Risk Factors:  1.  Coronary revascularization within 5 years: no  2. Recurrent chest pain: no  3. Shortness of breath:  no  4. Recent coronary evaluation/stress test/angiogram:  no  5. Recent MI (less than 1 month ago):  no  6. Prior MI (by way of history or pathological waves):  no  7. Compensated CHF or h/o CHF:  no  8. Severe valvular disease:  no  9. Decompensated CHF:  no  10. High-grade atrioventricular block:  no  11. Arrhythmia:  no    Other Risk Factors:  1. Diabetes hx:  no  2. H/o CVA:  no  3. Uncontrolled hypertension:  no  4, Advanced age:  no  5. Low functional capacity:  no  6. Recent use of: NSAIDs  7. Tetanus up to date: tetanus status unknown to the patient  8. Anesthesia Complications: None  9. History of abnormal bleeding : None  10. History of Blood Transfusions: no  11. Health Care Directive or Living Will: yes  12. Latex Allergy: no      Problem List:     Patient Active Problem List    Diagnosis Date Noted    Malignant neoplasm of left female breast ([facility] - ductal adenocarcinoma 10/11/2017    Acquired hypothyroidism 10/11/2017    Celiac disease 10/11/2017     Medical History:     Past Medical History:   Diagnosis Date    Arthritis     right hip    [email or web address] 12/20/2012    Left Breast    Headache     Thyroid disease     hypothyroid     Allergies: Allergies   Allergen Reactions    Mobic [Meloxicam] Other (comments)     Bloating and severe constipation    Other Medication Rash     Septra     Pcn [Penicillins] Rash    Penicillin G Rash    Septra [Sulfamethoprim] Rash    Sulfa (Sulfonamide Antibiotics) Other (comments)      Medications:     Current Outpatient Prescriptions   Medication Sig    azithromycin (ZITHROMAX) 250 mg tablet Take 250 mg by mouth See Admin Instructions.  levothyroxine (SYNTHROID) 100 mcg tablet Take 100 mcg by mouth Daily (before breakfast).  naproxen sodium (NAPROSYN) 220 mg tablet Take 220 mg by mouth two (2) times daily (with meals).  potassium 99 mg tablet Take 120 mg by mouth daily.     acetaminophen (TYLENOL ARTHRITIS PAIN) 650 mg CR tablet Take 650 mg by mouth every six (6) hours as needed for Pain.  ibuprofen (MOTRIN) 200 mg tablet Take 800 mg by mouth every six (6) hours as needed for Pain.  cholecalciferol (VITAMIN D3) 1,000 unit cap Take 2,000 Units by mouth daily.  biotin 5,000 mcg TbDi Take 5,000 mcg by mouth daily.  B.infantis-B.ani-B.long-B.bifi (PROBIOTIC 4X) 10-15 mg TbEC Take  by mouth daily.  magnesium 200 mg tab Take 400 mg by mouth daily. No current facility-administered medications for this visit. Surgical History:     Past Surgical History:   Procedure Laterality Date    HX APPENDECTOMY  2016    HX BREAST LUMPECTOMY  2013 2013    HX CARPAL TUNNEL RELEASE  2014    right    HX CYST REMOVAL  1986    behind left knee    HX HEENT  1986    bilateral tubes    HX ORTHOPAEDIC      carpal tunnel right    HX WISDOM TEETH EXTRACTION      x4     Social History:     Social History     Social History    Marital status:      Spouse name: N/A    Number of children: N/A    Years of education: N/A     Social History Main Topics    Smoking status: Never Smoker    Smokeless tobacco: Never Used    Alcohol use Yes      Comment: rare    Drug use: None    Sexual activity: Not Asked     Other Topics Concern    None     Social History Narrative         REVIEW OF SYSTEMS:  Review of Systems   Constitutional: Negative for chills and fever. HENT: Negative for ear pain, hearing loss and sore throat. Eyes: Negative for blurred vision and pain. Respiratory: Negative for cough and shortness of breath. Cardiovascular: Negative for chest pain. Gastrointestinal: Negative for abdominal pain. Genitourinary: Negative for dysuria and hematuria. Musculoskeletal: Positive for joint pain. Negative for myalgias. Skin: Negative for rash. Neurological: Negative for tingling, focal weakness and headaches. Endo/Heme/Allergies: Does not bruise/bleed easily. Psychiatric/Behavioral: Negative for substance abuse. Objective:     Vitals:    08/31/18 1212   BP: 103/72   Pulse: 69   Resp: 12   Temp: 98.8 °F (37.1 °C)   TempSrc: Oral   SpO2: 100%   Weight: 134 lb (60.8 kg)   Height: 5' 2\" (1.575 m)   PainSc:   0 - No pain   PainLoc: Ear       Physical Exam   Constitutional: She is oriented to person, place, and time and well-developed, well-nourished, and in no distress. HENT:   Head: Normocephalic and atraumatic. Right Ear: External ear normal.   Left Ear: External ear normal.   Nose: Nose normal.   Mouth/Throat: Oropharynx is clear and moist. No oropharyngeal exudate. Clear TMs. No perforation along her TM is appreciated   Eyes: Conjunctivae and EOM are normal. Pupils are equal, round, and reactive to light. Right eye exhibits no discharge. Left eye exhibits no discharge. No scleral icterus. Neck: Neck supple. Cardiovascular: Normal rate, regular rhythm, normal heart sounds and intact distal pulses. Exam reveals no gallop and no friction rub. No murmur heard. Pulmonary/Chest: Effort normal and breath sounds normal. No respiratory distress. She has no wheezes. She has no rales. Abdominal: Soft. Bowel sounds are normal. She exhibits no distension. There is no tenderness. There is no rebound and no guarding. Musculoskeletal: She exhibits no edema or tenderness (BUE). She has pain/discomfort with ROM exercises along her right hip   Lymphadenopathy:     She has no cervical adenopathy. Neurological: She is alert and oriented to person, place, and time. She exhibits normal muscle tone. Gait normal.   Skin: Skin is warm and dry. No erythema. Psychiatric: Affect normal.   Nursing note and vitals reviewed.       DIAGNOSTICS:   Lab Results   Component Value Date/Time    Sodium 142 06/25/2018 11:39 AM    Potassium 4.2 06/25/2018 11:39 AM    Chloride 104 06/25/2018 11:39 AM    CO2 25 06/25/2018 11:39 AM    Anion gap 6 12/21/2017 12:45 PM    Glucose 86 06/25/2018 11:39 AM    BUN 11 06/25/2018 11:39 AM    Creatinine 0.62 06/25/2018 11:39 AM    BUN/Creatinine ratio 18 06/25/2018 11:39 AM    GFR est  06/25/2018 11:39 AM    GFR est non- 06/25/2018 11:39 AM    Calcium 9.5 06/25/2018 11:39 AM     Lab Results   Component Value Date/Time    Cholesterol, total 159 10/11/2017 04:34 PM    HDL Cholesterol 71 (H) 10/11/2017 04:34 PM    LDL, calculated 43.8 10/11/2017 04:34 PM    VLDL, calculated 44.2 10/11/2017 04:34 PM    Triglyceride 221 (H) 10/11/2017 04:34 PM    CHOL/HDL Ratio 2.2 10/11/2017 04:34 PM     Lab Results   Component Value Date/Time    WBC 4.5 06/25/2018 11:39 AM    HGB 13.0 06/25/2018 11:39 AM    HCT 38.3 06/25/2018 11:39 AM    PLATELET 817 24/03/2401 11:39 AM    MCV 97 06/25/2018 11:39 AM       Assessment/Plan:   1. Left Otitis Media/Externa: S/p abx treatments.  -I encouraged her to follow-up with ENT. -Requesting records. 2. Arthralgia of hip (Right)  Preoperative Assessment: No contraindications to planned surgery   Orders/studies that need to be obtained prior to surgical clearance: medical clearance has been obtained    Pt is to undergo a low risk procedure with a low cardiac risk based on current history. Labs and imaging within acceptable range. No contraindications to planned surgery. Discontinue NSAIDS 1 week prior to surgical procedure. Follow up as scheduled post operatively. I have discussed the plan of care with the patient. The patient has received an after-visit summary and questions were answered concerning future plans. I have discussed medication side effects and warnings with the patient as well. All questions were answered. The patient understands the plan of care. Handouts provided today with the above information. Pt instructed if symptoms worsen to call the office or report to the ED for continued care. Greater than 50% of the visit time was spent in counseling and/or coordination of care.       Dr. Kate Escobar [facility], [address] [facility] [address] [facility], [address]  Phone: [phone]  Fax: [phone]

## 2018-09-04 ENCOUNTER — TELEPHONE (OUTPATIENT)
Dept: INTERNAL MEDICINE CLINIC | Age: 58
End: 2018-09-04

## 2018-09-04 NOTE — TELEPHONE ENCOUNTER
----- Message from Maryuri Vegas MD sent at 8/31/2018 12:43 PM EDT -----  Regarding: Orthopedic and Patient F/u  Hi Marya Pond,    Please call Orthopedics and the pt to see that they have received my preop clearance. Respectfully,  Dr. Nhi Stephenson  [facility], [address] [place] [address], [address] [place] [address] Str.  Phone: [phone]  Fax: [phone]    Chief Complaint   Patient presents with    Documentation     per DR Audrey Fofana faxed over to Orthopedic Dr Shelley Wheeler her office note/Preoperative Evaluation done 8-31-18     Faxed to Dr Shelley Severin office phone [phone]/ Fax [phone] per DR Audrey Fofana for the patient's upcoming Hip Surgery.

## 2018-10-03 RX ORDER — LEVOTHYROXINE SODIUM 100 UG/1
100 TABLET ORAL
Qty: 90 TAB | Refills: 3 | Status: SHIPPED | OUTPATIENT
Start: 2018-10-03 | End: 2018-10-03 | Stop reason: SDUPTHER

## 2018-10-03 RX ORDER — LEVOTHYROXINE SODIUM 100 UG/1
100 TABLET ORAL
Qty: 90 TAB | Refills: 3 | Status: SHIPPED | OUTPATIENT
Start: 2018-10-03 | End: 2019-09-23 | Stop reason: SDUPTHER

## 2018-10-03 NOTE — TELEPHONE ENCOUNTER
Transmission to pharmacy failed. Pending order to print. [facility] with [employer] states a new prescription is required for each fill. Requested Prescriptions     Pending Prescriptions Disp Refills    levothyroxine (SYNTHROID) 100 mcg tablet 90 Tab 3     Sig: Take 1 Tab by mouth Daily (before breakfast). Signed Prescriptions Disp Refills    levothyroxine (SYNTHROID) 100 mcg tablet 90 Tab 3     Sig: Take 1 Tab by mouth Daily (before breakfast).      Authorizing Provider: Atul Disla

## 2018-10-03 NOTE — TELEPHONE ENCOUNTER
Last Visit: 08/31/2018 with MD Ana Laura Rojas    Next Appointment: 12/05/2018 with MD Ana Laura Rojas     Requested Prescriptions     Pending Prescriptions Disp Refills    levothyroxine (SYNTHROID) 100 mcg tablet 90 Tab 3     Sig: Take 1 Tab by mouth Daily (before breakfast).

## 2018-11-20 ENCOUNTER — TELEPHONE (OUTPATIENT)
Dept: SURGERY | Age: 58
End: 2018-11-20

## 2018-11-20 NOTE — TELEPHONE ENCOUNTER
I called this patient to confirm her colonoscopy she stated that she just had hip surgery.   She also  She stated that she wanted to cancel this procedure she  did not think she needed to have the colonoscopy done \"because she was doing much better than when she saw us a few months ago\"

## 2018-11-30 ENCOUNTER — APPOINTMENT (OUTPATIENT)
Dept: INTERNAL MEDICINE CLINIC | Age: 58
End: 2018-11-30

## 2018-11-30 ENCOUNTER — HOSPITAL ENCOUNTER (OUTPATIENT)
Dept: LAB | Age: 58
Discharge: HOME | End: 2018-11-30

## 2018-11-30 PROCEDURE — 99001 SPECIMEN HANDLING PT-LAB: CPT

## 2018-12-01 LAB
ALBUMIN SERPL-MCNC: 4 G/DL (ref 3.5–5.5)
ALBUMIN/GLOB SERPL: 1.3 {RATIO} (ref 1.2–2.2)
ALP SERPL-CCNC: 136 IU/L (ref 39–117)
ALT SERPL-CCNC: 29 IU/L (ref 0–32)
AST SERPL-CCNC: 29 IU/L (ref 0–40)
BASOPHILS # BLD AUTO: 0 X10E3/UL (ref 0–0.2)
BASOPHILS NFR BLD AUTO: 0 %
BILIRUB SERPL-MCNC: 0.3 MG/DL (ref 0–1.2)
BUN SERPL-MCNC: 13 MG/DL (ref 6–24)
BUN/CREAT SERPL: 19 (ref 9–23)
CALCIUM SERPL-MCNC: 9.3 MG/DL (ref 8.7–10.2)
CHLORIDE SERPL-SCNC: 101 MMOL/L (ref 96–106)
CO2 SERPL-SCNC: 26 MMOL/L (ref 20–29)
CREAT SERPL-MCNC: 0.67 MG/DL (ref 0.57–1)
EOSINOPHIL # BLD AUTO: 0.2 X10E3/UL (ref 0–0.4)
EOSINOPHIL NFR BLD AUTO: 3 %
ERYTHROCYTE [DISTWIDTH] IN BLOOD BY AUTOMATED COUNT: 12.9 % (ref 12.3–15.4)
GLOBULIN SER CALC-MCNC: 3.1 G/DL (ref 1.5–4.5)
GLUCOSE SERPL-MCNC: 78 MG/DL (ref 65–99)
HCT VFR BLD AUTO: 36.9 % (ref 34–46.6)
HGB BLD-MCNC: 12 G/DL (ref 11.1–15.9)
IMM GRANULOCYTES # BLD: 0 X10E3/UL (ref 0–0.1)
IMM GRANULOCYTES NFR BLD: 0 %
LYMPHOCYTES # BLD AUTO: 0.7 X10E3/UL (ref 0.7–3.1)
LYMPHOCYTES NFR BLD AUTO: 16 %
MCH RBC QN AUTO: 32.3 PG (ref 26.6–33)
MCHC RBC AUTO-ENTMCNC: 32.5 G/DL (ref 31.5–35.7)
MCV RBC AUTO: 100 FL (ref 79–97)
MONOCYTES # BLD AUTO: 0.3 X10E3/UL (ref 0.1–0.9)
MONOCYTES NFR BLD AUTO: 7 %
NEUTROPHILS # BLD AUTO: 3.3 X10E3/UL (ref 1.4–7)
NEUTROPHILS NFR BLD AUTO: 74 %
PLATELET # BLD AUTO: 401 X10E3/UL (ref 150–379)
POTASSIUM SERPL-SCNC: 4.2 MMOL/L (ref 3.5–5.2)
PROT SERPL-MCNC: 7.1 G/DL (ref 6–8.5)
RBC # BLD AUTO: 3.71 X10E6/UL (ref 3.77–5.28)
SODIUM SERPL-SCNC: 140 MMOL/L (ref 134–144)
T4 FREE SERPL-MCNC: 1.64 NG/DL (ref 0.82–1.77)
TSH SERPL DL<=0.005 MIU/L-ACNC: 6.21 UIU/ML (ref 0.45–4.5)
WBC # BLD AUTO: 4.5 X10E3/UL (ref 3.4–10.8)

## 2018-12-02 NOTE — PROGRESS NOTES
Her most recent labs show that her TSH is 6.21 and her fT4 is normal at 1.64. Her blood counts are unremarkable except for a mildly elevated platelet count of 166. Her liver and renal labs are normal. I will recheck her TFTs and CBC in 6 wks. If her TFTs are unchanged, I will need to refer her to Endocrine for medication recommendations. For now, she should c/w Synthroid as prescribed. I will discuss this with her at her f/u apt this wk.     Dr. Aubree Ann  [facility], [address] [place] [address] Str.  Phone: [phone]  Fax: [phone]

## 2018-12-05 ENCOUNTER — HOSPITAL ENCOUNTER (OUTPATIENT)
Dept: LAB | Age: 58
Discharge: HOME | End: 2018-12-05

## 2018-12-05 ENCOUNTER — OFFICE VISIT (OUTPATIENT)
Dept: INTERNAL MEDICINE CLINIC | Age: 58
End: 2018-12-05

## 2018-12-05 VITALS
DIASTOLIC BLOOD PRESSURE: 68 MMHG | BODY MASS INDEX: 25.21 KG/M2 | HEART RATE: 81 BPM | HEIGHT: 62 IN | OXYGEN SATURATION: 99 % | TEMPERATURE: 96.6 F | RESPIRATION RATE: 12 BRPM | SYSTOLIC BLOOD PRESSURE: 106 MMHG | WEIGHT: 137 LBS

## 2018-12-05 DIAGNOSIS — D75.839 THROMBOCYTOSIS: ICD-10-CM

## 2018-12-05 DIAGNOSIS — E03.9 ACQUIRED HYPOTHYROIDISM: Primary | ICD-10-CM

## 2018-12-05 DIAGNOSIS — C50.912 MALIGNANT NEOPLASM OF LEFT FEMALE BREAST, UNSPECIFIED ESTROGEN RECEPTOR STATUS, UNSPECIFIED SITE OF BREAST (HCC): ICD-10-CM

## 2018-12-05 PROCEDURE — 99001 SPECIMEN HANDLING PT-LAB: CPT

## 2018-12-05 NOTE — PROGRESS NOTES
Chief Complaint Patient presents with  Hypothyroidism  
  follow up  Celiac  
  follow up  Labs  
  done 11-30-18 1. Have you been to the ER, urgent care clinic since your last visit? Hospitalized since your last visit? No 
 
2. Have you seen or consulted any other health care providers outside of the [address] [place] [address] since your last visit? Include any pap smears or colon screening. No 
 
 
Health Maintenance Due Topic Date Due  Pneumococcal 19-64 Highest Risk (1 of 3 - PCV13) 12/20/1979  
 DTaP/Tdap/Td series (1 - Tdap) 12/20/1981  Shingrix Vaccine Age 50> (1 of 2) 12/20/2010  
 FOBT Q 1 YEAR AGE 50-75  12/20/2010  Influenza Age 5 to Adult  08/01/2018

## 2018-12-05 NOTE — PATIENT INSTRUCTIONS
Health Maintenance Due Topic Date Due  Pneumococcal 19-64 Highest Risk (1 of 3 - PCV13) 12/20/1979  
 DTaP/Tdap/Td series (1 - Tdap) 12/20/1981  Shingrix Vaccine Age 50> (1 of 2) 12/20/2010  
 FOBT Q 1 YEAR AGE 50-75  12/20/2010  Influenza Age 5 to Adult  08/01/2018 Hypothyroidism: Care Instructions Your Care Instructions You have hypothyroidism, which means that your body is not making enough thyroid hormone. This hormone helps your body use energy. If your thyroid level is low, you may feel tired, be constipated, have an increase in your blood pressure, or have dry skin or memory problems. You may also get cold easily, even when it is warm. Women with low thyroid levels may have heavy menstrual periods. A blood test to find your thyroid-stimulating hormone (TSH) level is used to check for hypothyroidism. A high TSH level may mean that you have low thyroid. When your body is not making enough thyroid hormone, TSH levels rise in an effort to make the body produce more. The treatment for hypothyroidism is to take thyroid hormone pills. You should start to feel better in 1 to 2 weeks. But it can take several months to see changes in the TSH level. You will need regular visits with your doctor to make sure you have the right dose of medicine. Most people need treatment for the rest of their lives. You will need to see your doctor regularly to have blood tests and to make sure you are doing well. Follow-up care is a key part of your treatment and safety. Be sure to make and go to all appointments, and call your doctor if you are having problems. It's also a good idea to know your test results and keep a list of the medicines you take. How can you care for yourself at home? · Take your thyroid hormone medicine exactly as prescribed. Call your doctor if you think you are having a problem with your medicine.  Most people do not have side effects if they take the right amount of medicine regularly. ? Take the medicine 30 minutes before breakfast, and do not take it with calcium, vitamins, or iron. ? Do not take extra doses of your thyroid medicine. It will not help you get better any faster, and it may cause side effects. ? If you forget to take a dose, do NOT take a double dose of medicine. Take your usual dose the next day. · Tell your doctor about all prescription, herbal, or over-the-counter products you take. · Take care of yourself. Eat a healthy diet, get enough sleep, and get regular exercise. When should you call for help? Call 911 anytime you think you may need emergency care. For example, call if: 
  · You passed out (lost consciousness).  
  · You have severe trouble breathing.  
  · You have a very slow heartbeat (less than 60 beats a minute).  
  · You have a low body temperature (95°F or below).  
 Call your doctor now or seek immediate medical care if: 
  · You feel tired, sluggish, or weak.  
  · You have trouble remembering things or concentrating.  
  · You do not begin to feel better 2 weeks after starting your medicine.  
 Watch closely for changes in your health, and be sure to contact your doctor if you have any problems. Where can you learn more? Go to http://shamir-majo.info/. Enter C428 in the search box to learn more about \"Hypothyroidism: Care Instructions. \" Current as of: March 15, 2018 Content Version: 11.8 © 5616-4567 [employer]. Care instructions adapted under license by [employer] (which disclaims liability or warranty for this information). If you have questions about a medical condition or this instruction, always ask your healthcare professional. [address] any warranty or liability for your use of this information.

## 2018-12-05 NOTE — PROGRESS NOTES
INTERNISTS OF [facility]: 
12/11/2018, MRN: [id number] Pieter Meigs is a 62 y.o. female and presents to clinic for Hypothyroidism (follow up); Celiac (follow up); and Labs (done 11-30-18 ) Subjective: The pt is a 26-year-old female with history of left breast adenocarcinoma (s/p lumpectomy and XRT in 2012), celiac disease, chronic constipation, and hypothyroidism. 1.  Health Maintenance: She is rescheduling her colon cancer screening. No hematochezia or melena. 2.  Hypothyroidism: Her most recent labs show that her TSH is 6.21 and her fT4 is normal at 1.64. She continues to take Synthroid. 3.  Thrombocytosis: Her most recent CBC shows a mildly elevated white count of 401. She denies medication, melena, hematuria, and vaginal bleeding. No easy bleeding or bruising. 4. H/o Left Breast Adenocarcinoma: Status post lumpectomy and radiation therapy in 2012. Her last mammogram per hour EHR was done in January 2018. The patient has not had a follow-up mammogram ordered. She continues to get serial breast exams by her specialist per her hx today. She denies suspicious breast pain or masses. Patient Active Problem List  
 Diagnosis Date Noted  Malignant neoplasm of left female breast ([facility] [address] - ductal adenocarcinoma 10/11/2017  Acquired hypothyroidism 10/11/2017  Celiac disease 10/11/2017 Current Outpatient Medications Medication Sig Dispense Refill  varicella-zoster recombinant, PF, (SHINGRIX) 50 mcg/0.5 mL susr injection 0.5 mL by IntraMUSCular route every two (2) months. 0.5 mL 1  
 levothyroxine (SYNTHROID) 100 mcg tablet Take 1 Tab by mouth Daily (before breakfast). 90 Tab 3  
 naproxen sodium (NAPROSYN) 220 mg tablet Take 220 mg by mouth two (2) times daily (with meals).  potassium 99 mg tablet Take 120 mg by mouth daily.  acetaminophen (TYLENOL ARTHRITIS PAIN) 650 mg CR tablet Take 650 mg by mouth every six (6) hours as needed for Pain.  ibuprofen (MOTRIN) 200 mg tablet Take 800 mg by mouth every six (6) hours as needed for Pain.  cholecalciferol (VITAMIN D3) 1,000 unit cap Take 2,000 Units by mouth daily.  biotin 5,000 mcg TbDi Take 5,000 mcg by mouth daily.  B.infantis-B.ani-B.long-B.bifi (PROBIOTIC 4X) 10-15 mg TbEC Take  by mouth daily.  magnesium 200 mg tab Take 400 mg by mouth daily. Allergies Allergen Reactions  Mobic [Meloxicam] Other (comments) Bloating and severe constipation  Other Medication Rash Septra  Pcn [Penicillins] Rash  Penicillin G Rash  Septra [Sulfamethoprim] Rash  Sulfa (Sulfonamide Antibiotics) Other (comments) Past Medical History:  
Diagnosis Date  Arthritis   
 right hip  Cancer ([facility] [address] 12/20/2012 Left Breast  
 Headache  Thyroid disease   
 hypothyroid Past Surgical History:  
Procedure Laterality Date  HX APPENDECTOMY  2016  HX BREAST LUMPECTOMY  2013 2013  HX CARPAL TUNNEL RELEASE  2014  
 right  HX CYST REMOVAL  1986  
 behind left knee  HX HEENT  1986  
 bilateral tubes  HX ORTHOPAEDIC    
 carpal tunnel right  HX WISDOM TEETH EXTRACTION    
 x4 Family History Problem Relation Age of Onset  Cancer Mother   
     colon,liver  Hypertension Mother  Heart Disease Father  Heart Attack Father  No Known Problems Brother  No Known Problems Maternal Grandmother  No Known Problems Maternal Grandfather  No Known Problems Paternal Grandmother  No Known Problems Paternal Grandfather  No Known Problems Son  Diabetes Daughter  Thyroid Disease Daughter Social History Tobacco Use  Smoking status: Never Smoker  Smokeless tobacco: Never Used Substance Use Topics  Alcohol use: Yes Comment: rare ROS Review of Systems Constitutional: Negative for chills and fever. HENT: Negative for ear pain and sore throat. Eyes: Negative for blurred vision and pain. Respiratory: Negative for cough and shortness of breath. Cardiovascular: Negative for chest pain. Gastrointestinal: Negative for abdominal pain, blood in stool and melena. Genitourinary: Negative for dysuria and hematuria. Musculoskeletal: Negative for joint pain and myalgias. Skin: Negative for rash. Neurological: Negative for tingling, focal weakness and headaches. Endo/Heme/Allergies: Does not bruise/bleed easily. Psychiatric/Behavioral: Negative for substance abuse. Objective Vitals:  
 12/05/18 1013 BP: 106/68 Pulse: 81 Resp: 12 Temp: 96.6 °F (35.9 °C) TempSrc: Oral  
SpO2: 99% Weight: 137 lb (62.1 kg) Height: 5' 2\" (1.575 m) PainSc:   0 - No pain Physical Exam  
Constitutional: She is oriented to person, place, and time and well-developed, well-nourished, and in no distress. HENT:  
Head: Normocephalic and atraumatic. Right Ear: External ear normal.  
Left Ear: External ear normal.  
Nose: Nose normal.  
Mouth/Throat: Oropharynx is clear and moist. No oropharyngeal exudate. Eyes: Conjunctivae and EOM are normal. Pupils are equal, round, and reactive to light. Right eye exhibits no discharge. Left eye exhibits no discharge. No scleral icterus. Neck: Neck supple. Cardiovascular: Normal rate, regular rhythm, normal heart sounds and intact distal pulses. Exam reveals no gallop and no friction rub. No murmur heard. Pulmonary/Chest: Effort normal and breath sounds normal. No respiratory distress. She has no wheezes. She has no rales. Abdominal: Soft. Bowel sounds are normal. She exhibits no distension. There is no tenderness. There is no rebound and no guarding. Musculoskeletal: She exhibits no edema or tenderness (Bue). Lymphadenopathy:  
  She has no cervical adenopathy. Neurological: She is alert and oriented to person, place, and time. She exhibits normal muscle tone.  Gait normal.  
 Skin: Skin is warm and dry. No erythema. Psychiatric: Affect normal.  
Nursing note and vitals reviewed. LABS Data Review:  
Lab Results Component Value Date/Time WBC 3.8 12/05/2018 11:23 AM  
 HGB 11.9 12/05/2018 11:23 AM  
 HCT 35.9 12/05/2018 11:23 AM  
 PLATELET 740 05/75/4522 11:23 AM  
 MCV 96 12/05/2018 11:23 AM  
 
 
Lab Results Component Value Date/Time Sodium 140 11/30/2018 10:39 AM  
 Potassium 4.2 11/30/2018 10:39 AM  
 Chloride 101 11/30/2018 10:39 AM  
 CO2 26 11/30/2018 10:39 AM  
 Anion gap 6 12/21/2017 12:45 PM  
 Glucose 78 11/30/2018 10:39 AM  
 BUN 13 11/30/2018 10:39 AM  
 Creatinine 0.67 11/30/2018 10:39 AM  
 BUN/Creatinine ratio 19 11/30/2018 10:39 AM  
 GFR est  11/30/2018 10:39 AM  
 GFR est non-AA 98 11/30/2018 10:39 AM  
 Calcium 9.3 11/30/2018 10:39 AM  
 
 
Lab Results Component Value Date/Time Cholesterol, total 159 10/11/2017 04:34 PM  
 HDL Cholesterol 71 (H) 10/11/2017 04:34 PM  
 LDL, calculated 43.8 10/11/2017 04:34 PM  
 VLDL, calculated 44.2 10/11/2017 04:34 PM  
 Triglyceride 221 (H) 10/11/2017 04:34 PM  
 CHOL/HDL Ratio 2.2 10/11/2017 04:34 PM  
 
 
No results found for: HBA1C, HGBE8, OYR1GJOZ, MMC3NDUH, EJI2LUHP Assessment/Plan: 1. Acquired hypothyroidism: Recent TSH is elevated but her fT4 is normal.  
Repeating her TFTs. If they are still discordant, I will refer her to Endocrinology for treatment recommendations and long term f/u. ORDERS: 
- TSH AND FREE T4; Future 2. Thrombocytosis: Mild per her most recent results. Rechecking her CBC. ORDERS: 
- CBC WITH AUTOMATED DIFF; Future 3. Malignant neoplasm of left female breast:  In remission. S/p lumpectomy and XRT. Ordering bilateral diagnostic mammogram studies. ORDERS: 
- DEISI MAMMO BI DX INCL CAD; Future 4. Health Maintenance: 
I encouraged her to get all recommended vaccinations. I encouraged her colon cancer screening.  
 
 
Health Maintenance Due  
 Topic Date Due  Pneumococcal 19-64 Highest Risk (1 of 3 - PCV13) 12/20/1979  
 DTaP/Tdap/Td series (1 - Tdap) 12/20/1981  Shingrix Vaccine Age 50> (1 of 2) 12/20/2010  
 FOBT Q 1 YEAR AGE 50-75  12/20/2010  Influenza Age 5 to Adult  08/01/2018 Lab review: labs are reviewed in the EHR and ordered as mentioned above I have discussed the diagnosis with the patient and the intended plan as seen in the above orders. The patient has received an after-visit summary and questions were answered concerning future plans. I have discussed medication side effects and warnings with the patient as well. I have reviewed the plan of care with the patient, accepted their input and they are in agreement with the treatment goals. All questions were answered. The patient understands the plan of care. Handouts provided today with above information. Pt instructed if symptoms worsen to call the office or report to the ED for continued care. Greater than 50% of the visit time was spent in counseling and/or coordination of care. Voice recognition was used to generate this report, which may have resulted in some phonetic based errors in grammar and contents. Even though attempts were made to correct all the mistakes, some may have been missed, and remained in the body of the document. Follow-up Disposition: 
Return in about 1 year (around 12/5/2019) for check up.  
 
Gwynneth Alpers, MD

## 2018-12-06 LAB
BASOPHILS # BLD AUTO: 0 X10E3/UL (ref 0–0.2)
BASOPHILS NFR BLD AUTO: 1 %
EOSINOPHIL # BLD AUTO: 0.1 X10E3/UL (ref 0–0.4)
EOSINOPHIL NFR BLD AUTO: 2 %
ERYTHROCYTE [DISTWIDTH] IN BLOOD BY AUTOMATED COUNT: 12.8 % (ref 12.3–15.4)
HCT VFR BLD AUTO: 35.9 % (ref 34–46.6)
HGB BLD-MCNC: 11.9 G/DL (ref 11.1–15.9)
IMM GRANULOCYTES # BLD: 0 X10E3/UL (ref 0–0.1)
IMM GRANULOCYTES NFR BLD: 0 %
LYMPHOCYTES # BLD AUTO: 0.6 X10E3/UL (ref 0.7–3.1)
LYMPHOCYTES NFR BLD AUTO: 16 %
MCH RBC QN AUTO: 31.8 PG (ref 26.6–33)
MCHC RBC AUTO-ENTMCNC: 33.1 G/DL (ref 31.5–35.7)
MCV RBC AUTO: 96 FL (ref 79–97)
MONOCYTES # BLD AUTO: 0.3 X10E3/UL (ref 0.1–0.9)
MONOCYTES NFR BLD AUTO: 7 %
NEUTROPHILS # BLD AUTO: 2.8 X10E3/UL (ref 1.4–7)
NEUTROPHILS NFR BLD AUTO: 74 %
PLATELET # BLD AUTO: 344 X10E3/UL (ref 150–379)
RBC # BLD AUTO: 3.74 X10E6/UL (ref 3.77–5.28)
T4 FREE SERPL-MCNC: 1.86 NG/DL (ref 0.82–1.77)
TSH SERPL DL<=0.005 MIU/L-ACNC: 1.74 UIU/ML (ref 0.45–4.5)
WBC # BLD AUTO: 3.8 X10E3/UL (ref 3.4–10.8)

## 2018-12-11 ENCOUNTER — CLINICAL SUPPORT (OUTPATIENT)
Dept: INTERNAL MEDICINE CLINIC | Age: 58
End: 2018-12-11

## 2018-12-11 VITALS — TEMPERATURE: 97.8 F

## 2018-12-11 DIAGNOSIS — Z23 ENCOUNTER FOR IMMUNIZATION: ICD-10-CM

## 2018-12-11 NOTE — PATIENT INSTRUCTIONS
Vaccine Information Statement    Influenza (Flu) Vaccine (Inactivated or Recombinant): What you need to know    Many Vaccine Information Statements are available in [place]ian and other languages. See www.immunize.org/vis  Hojas de Información Sobre Vacunas están disponibles en Español y en muchos otros idiomas. Visite www.immunize.org/vis    1. Why get vaccinated? Influenza (flu) is a contagious disease that spreads around the [place] every year, usually between October and May. Flu is caused by influenza viruses, and is spread mainly by coughing, sneezing, and close contact. Anyone can get flu. Flu strikes suddenly and can last several days. Symptoms vary by age, but can include:   fever/chills   sore throat   muscle aches   fatigue   cough   headache    runny or stuffy nose    Flu can also lead to pneumonia and blood infections, and cause diarrhea and seizures in children. If you have a medical condition, such as heart or lung disease, flu can make it worse. Flu is more dangerous for some people. Infants and young children, people 72years of age and older, pregnant women, and people with certain health conditions or a weakened immune system are at greatest risk. Each year thousands of people in the [facility] die from flu, and many more are hospitalized. Flu vaccine can:   keep you from getting flu,   make flu less severe if you do get it, and   keep you from spreading flu to your family and other people. 2. Inactivated and recombinant flu vaccines    A dose of flu vaccine is recommended every flu season. Children 6 months through 6years of age may need two doses during the same flu season. Everyone else needs only one dose each flu season.        Some inactivated flu vaccines contain a very small amount of a mercury-based preservative called thimerosal. Studies have not shown thimerosal in vaccines to be harmful, but flu vaccines that do not contain thimerosal are available. There is no live flu virus in flu shots. They cannot cause the flu. There are many flu viruses, and they are always changing. Each year a new flu vaccine is made to protect against three or four viruses that are likely to cause disease in the upcoming flu season. But even when the vaccine doesnt exactly match these viruses, it may still provide some protection    Flu vaccine cannot prevent:   flu that is caused by a virus not covered by the vaccine, or   illnesses that look like flu but are not. It takes about 2 weeks for protection to develop after vaccination, and protection lasts through the flu season. 3. Some people should not get this vaccine    Tell the person who is giving you the vaccine:     If you have any severe, life-threatening allergies. If you ever had a life-threatening allergic reaction after a dose of flu vaccine, or have a severe allergy to any part of this vaccine, you may be advised not to get vaccinated. Most, but not all, types of flu vaccine contain a small amount of egg protein.  If you ever had Guillain-Barré Syndrome (also called GBS). Some people with a history of GBS should not get this vaccine. This should be discussed with your doctor.  If you are not feeling well. It is usually okay to get flu vaccine when you have a mild illness, but you might be asked to come back when you feel better. 4. Risks of a vaccine reaction    With any medicine, including vaccines, there is a chance of reactions. These are usually mild and go away on their own, but serious reactions are also possible. Most people who get a flu shot do not have any problems with it.      Minor problems following a flu shot include:    soreness, redness, or swelling where the shot was given     hoarseness   sore, red or itchy eyes   cough   fever   aches   headache   itching   fatigue  If these problems occur, they usually begin soon after the shot and last 1 or 2 days. More serious problems following a flu shot can include the following:     There may be a small increased risk of Guillain-Barré Syndrome (GBS) after inactivated flu vaccine. This risk has been estimated at 1 or 2 additional cases per million people vaccinated. This is much lower than the risk of severe complications from flu, which can be prevented by flu vaccine.  Young children who get the flu shot along with pneumococcal vaccine (PCV13) and/or DTaP vaccine at the same time might be slightly more likely to have a seizure caused by fever. Ask your doctor for more information. Tell your doctor if a child who is getting flu vaccine has ever had a seizure. Problems that could happen after any injected vaccine:      People sometimes faint after a medical procedure, including vaccination. Sitting or lying down for about 15 minutes can help prevent fainting, and injuries caused by a fall. Tell your doctor if you feel dizzy, or have vision changes or ringing in the ears.  Some people get severe pain in the shoulder and have difficulty moving the arm where a shot was given. This happens very rarely.  Any medication can cause a severe allergic reaction. Such reactions from a vaccine are very rare, estimated at about 1 in a million doses, and would happen within a few minutes to a few hours after the vaccination. As with any medicine, there is a very remote chance of a vaccine causing a serious injury or death. The safety of vaccines is always being monitored. For more information, visit: www.cdc.gov/vaccinesafety/    5. What if there is a serious reaction? What should I look for?  Look for anything that concerns you, such as signs of a severe allergic reaction, very high fever, or unusual behavior.     Signs of a severe allergic reaction can include hives, swelling of the face and throat, difficulty breathing, a fast heartbeat, dizziness, and weakness - usually within a few minutes to a few hours after the vaccination. What should I do?  If you think it is a severe allergic reaction or other emergency that cant wait, call 9-1-1 and get the person to the nearest hospital. Otherwise, call your doctor.  Reactions should be reported to the Vaccine Adverse Event Reporting System (VAERS). Your doctor should file this report, or you can do it yourself through  the VAERS web site at www.vaers. [facility].gov, or by calling 4-588.317.1517. VAERS does not give medical advice. 6. The National Vaccine Injury Compensation Program    The [facility] Vaccine Injury Compensation Program (VICP) is a federal program that was created to compensate people who may have been injured by certain vaccines. Persons who believe they may have been injured by a vaccine can learn about the program and about filing a claim by calling 4-616.481.5451 or visiting the [employer] website at www.[facility].gov/vaccinecompensation. There is a time limit to file a claim for compensation. 7. How can I learn more?  Ask your healthcare provider. He or she can give you the vaccine package insert or suggest other sources of information.  Call your local or state health department.  Contact the Centers for Disease Control and Prevention (CDC):  - Call 4-476.686.9325 (1-800-CDC-INFO) or  - Visit CDCs website at www.cdc.gov/flu    Vaccine Information Statement   Inactivated Influenza Vaccine   8/7/2015  42 MADIHA Mery Blair [id number]    Department of Health and Human Services  Centers for Disease Control and Prevention    Office Use Only

## 2018-12-11 NOTE — PROGRESS NOTES
Hernando Schmitz 1960 female who presents for routine immunizations. Patient denies any symptoms , reactions or allergies that would exclude them from being immunized today. Risks and adverse reactions were discussed and the VIS was given to them. All questions were addressed. Order placed for flu,  per Verbal Order from  with read back. Patient was observed for 15 min post injection. There were no reactions observed.     Carolyn Brown LPN

## 2018-12-14 ENCOUNTER — TELEPHONE (OUTPATIENT)
Dept: INTERNAL MEDICINE CLINIC | Age: 58
End: 2018-12-14

## 2018-12-14 DIAGNOSIS — E03.9 ACQUIRED HYPOTHYROIDISM: Primary | ICD-10-CM

## 2018-12-14 NOTE — TELEPHONE ENCOUNTER
----- Message from Bhupendra Valle MD sent at 12/14/2018  3:26 AM EST -----  Please let her know that her Synthroid dose of 100mcg daily will need to be adjusted. Her most recent lab work shows that her fT4 is up at 1.86 whereas her TSH is normal at 1.74. Note that just 2 wks ago, the TSH was up to 6.21 and her fT4 was normal. I am referring her to Endocrinology for assistance with her Synthroid dosing.     Dr. Jeanine Pastrana  [facility] [address], [address] [place] [address].  Phone: [phone]  Fax: [phone]

## 2018-12-14 NOTE — TELEPHONE ENCOUNTER
Chief Complaint   Patient presents with    Labs     done 12-05-18 per Dr Randy Martinez     Left a voice message for the patient to return my call. 12-17-18 the patient was reached and given the Endocrinology contact number to Dr Marquis Parikh office [phone] and understands to contact their office today to make her appointment for further evaluation and assistance with her Synthroid medication dosing. All understood.

## 2018-12-14 NOTE — PROGRESS NOTES
Please let her know that her Synthroid dose of 100mcg daily will need to be adjusted. Her most recent lab work shows that her fT4 is up at 1.86 whereas her TSH is normal at 1.74. Note that just 2 wks ago, the TSH was up to 6.21 and her fT4 was normal. I am referring her to Endocrinology for assistance with her Synthroid dosing.     Dr. Cristina Carranza  [facility] [address]  Phone: [phone]  Fax: [phone]

## 2019-01-24 ENCOUNTER — OFFICE VISIT (OUTPATIENT)
Dept: INTERNAL MEDICINE CLINIC | Age: 59
End: 2019-01-24

## 2019-01-24 VITALS
WEIGHT: 131 LBS | RESPIRATION RATE: 14 BRPM | SYSTOLIC BLOOD PRESSURE: 106 MMHG | HEIGHT: 62 IN | TEMPERATURE: 97.7 F | BODY MASS INDEX: 24.11 KG/M2 | DIASTOLIC BLOOD PRESSURE: 70 MMHG | OXYGEN SATURATION: 100 % | HEART RATE: 99 BPM

## 2019-01-24 DIAGNOSIS — K90.0 CELIAC DISEASE: ICD-10-CM

## 2019-01-24 DIAGNOSIS — K52.9 GASTROENTERITIS: Primary | ICD-10-CM

## 2019-01-24 NOTE — PROGRESS NOTES
1. Have you been to the ER, urgent care clinic or hospitalized since your last visit? NO.  
 
2. Have you seen or consulted any other health care providers outside of the [address] [place] [address] since your last visit (Include any pap smears or colon screening)?  NO

## 2019-01-24 NOTE — PROGRESS NOTES
HPI/History Meka Harley is a 62 y.o.  female who presents for evaluation. Pt with hx noted below and reports she was feeling fine then last night she experienced some nausea which led to vomiting a couple of times. Had a normal bowel movement following then again at some point later. Admits to some belching but no other sxs. Nausea has since tapered off and pt reports feeling much better. Denies any bloody or otherwise abnormal emesis. No dark/bloody stools. Denies diarrhea or constipation but historically tends toward constipation. No fevers. Had some abdominal cramping without pain initially but resolved. Denies  sxs. Reports eating fruits and vegetables for the last 3wks. Denies any questionable meals or water. Denies travel. Possible 2nd hand/distant exposure to similar. Reports she is no longer taking any NSAIDs and none during this time. Hx appendectomy. No other complaints. Patient Active Problem List  
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  
Diagnosis Date  Arthritis   
 right hip  Cancer ([facility] [address] 12/20/2012 Left Breast  
 Headache  Thyroid disease   
 hypothyroid Past Surgical History:  
Procedure Laterality Date  HX APPENDECTOMY  2016  HX BREAST LUMPECTOMY  2013 2013  HX CARPAL TUNNEL RELEASE  2014  
 right  HX CYST REMOVAL  1986  
 behind left knee  HX HEENT  1986  
 bilateral tubes  HX ORTHOPAEDIC    
 carpal tunnel right  HX WISDOM TEETH EXTRACTION    
 x4 Social History Socioeconomic History  Marital status:  Spouse name: Not on file  Number of children: Not on file  Years of education: Not on file  Highest education level: Not on file Social Needs  Financial resource strain: Not on file  Food insecurity - worry: Not on file  Food insecurity - inability: Not on file  Transportation needs - medical: Not on file  Transportation needs - non-medical: Not on file Occupational History  Not on file Tobacco Use  Smoking status: Never Smoker  Smokeless tobacco: Never Used Substance and Sexual Activity  Alcohol use: Yes Comment: rare  Drug use: Not on file  Sexual activity: Not on file Other Topics Concern  Not on file Social History Narrative  Not on file Family History Problem Relation Age of Onset  Cancer Mother   
     colon,liver  Hypertension Mother  Heart Disease Father  Heart Attack Father  No Known Problems Brother  No Known Problems Maternal Grandmother  No Known Problems Maternal Grandfather  No Known Problems Paternal Grandmother  No Known Problems Paternal Grandfather  No Known Problems Son  Diabetes Daughter  Thyroid Disease Daughter Current Outpatient Medications Medication Sig  levothyroxine (SYNTHROID) 100 mcg tablet Take 1 Tab by mouth Daily (before breakfast).  potassium 99 mg tablet Take 120 mg by mouth daily.  acetaminophen (TYLENOL ARTHRITIS PAIN) 650 mg CR tablet Take 650 mg by mouth every six (6) hours as needed for Pain.  cholecalciferol (VITAMIN D3) 1,000 unit cap Take 2,000 Units by mouth daily.  biotin 5,000 mcg TbDi Take 5,000 mcg by mouth daily.  B.infantis-B.ani-B.long-B.bifi (PROBIOTIC 4X) 10-15 mg TbEC Take  by mouth daily.  magnesium 200 mg tab Take 400 mg by mouth daily.  varicella-zoster recombinant, PF, (SHINGRIX) 50 mcg/0.5 mL susr injection 0.5 mL by IntraMUSCular route every two (2) months. No current facility-administered medications for this visit. Allergies Allergen Reactions  Mobic [Meloxicam] Other (comments) Bloating and severe constipation  Other Medication Rash Septra  Pcn [Penicillins] Rash  Penicillin G Rash  Septra [Sulfamethoprim] Rash  Sulfa (Sulfonamide Antibiotics) Other (comments) Review of Systems Aside from those included in HPI, remainder of complete ROS negative. Physical Examination Visit Vitals /70 (BP 1 Location: Left arm, BP Patient Position: Sitting) Pulse 99 Temp 97.7 °F (36.5 °C) (Oral) Resp 14 Ht 5' 2\" (1.575 m) Wt 131 lb (59.4 kg) SpO2 100% BMI 23.96 kg/m² General - Alert and in no acute distress. Pt appears well, comfortable, and in good spirits. Pleasant, engaging. Nontoxic. Not anxious, non-diaphoretic. Mental status - Appropriate mood, behavior, speech content, dress, and thought processes. Mouth - Mucous membranes moist. Pharynx without lesions, swelling, erythema, or exudate. Neck - Supple without rigidity. Pulm - No tachypnea, retractions, or cyanosis. Good respiratory effort. Clear to auscultation bilat. Cardiovascular - Normal rate, regular rhythm. Abdomen - Nondistended. Active bowel sounds. Soft, nontender. No guarding, rigidity, or rebound. Negative Sue Lambert, and Rovsing. No appreciable masses or organomegaly. Assessment and Plan 1. Gastroenteritis - Discussed differentials but no definite cause at this time. Favor self-limited process as pt reports feeling much better with no developments. Opted to hold on labs or other eval for now. She does not wish for nausea medications; she will try prilosec for the near future and incorporate small bland meals and ensure hydration. She will monitor, including temps, and return/call if needed, worsening, or developments. Further planning as warranted. Pt happily agrees with plan. More than 25 mins spent during visit with more than 50% discussing above issues, potential causes/contributing factors, eval/tx, plan, and questions. PLEASE NOTE:  
This document has been produced using voice recognition software. Unrecognized errors in transcription may be present. Nan Bowman PA-C [facility] 
[phone] 
1/24/2019

## 2019-02-04 ENCOUNTER — TELEPHONE (OUTPATIENT)
Dept: INTERNAL MEDICINE CLINIC | Age: 59
End: 2019-02-04

## 2019-02-04 NOTE — TELEPHONE ENCOUNTER
Chief Complaint   Patient presents with    Results     done 01-31-19 Mammogram  per Dr Shelly Denise     02-04-19 Patient reached and 2 identifiers were used: Full Name, and Date of Birth. The patient had already been notified by another staff member today of the results, and states she understands all.

## 2019-02-04 NOTE — TELEPHONE ENCOUNTER
----- Message from Ayush Weathers MD sent at 2/2/2019  3:26 PM EST -----  Please let her know that her mammogram shows no suspicious findings for breast cancer.     Dr. Yani Camacho  [facility] [address], [address] [place] [address] Str.  Phone: [phone]  Fax: [phone]

## 2019-02-04 NOTE — TELEPHONE ENCOUNTER
----- Message from Rip Isbell MD sent at 2/2/2019  3:26 PM EST -----  Please let her know that her mammogram shows no suspicious findings for breast cancer.     Dr. Maldonado Ann  [facility] [address], [address] [place] [address] Str.  Phone: [phone]  Fax: [phone]

## 2019-02-05 NOTE — TELEPHONE ENCOUNTER
Patient called stating she contacted the endocrinologist office and they still have not received the referral.

## 2019-02-05 NOTE — TELEPHONE ENCOUNTER
Chief Complaint   Patient presents with    Labs     done 12-05-18 per Dr Pranay Carpenter Referral Request     per [facility] who are requesting the Endocrinology Referral and Notes, Labs from Dr Mariaelena Edward     02-05-19 I called [facility] at phone [phone] they were reached, and 2 identifiers were used: Full Name, and Date of Birth of the patient. I confirmed the patient does have an upcoming appointment 2-07-19 at 3:45 pm, and informed them that I am faxing over the Referral, Notes, Labs, Demographics per their request for their review, and faxing to fax [phone]. All understood. The patient also reached and informed all have been faxed over to the [facility] office today. The patient understands all.

## 2019-06-25 ENCOUNTER — CLINICAL SUPPORT (OUTPATIENT)
Dept: INTERNAL MEDICINE CLINIC | Age: 59
End: 2019-06-25

## 2019-06-25 DIAGNOSIS — Z11.1 SCREENING-PULMONARY TB: Primary | ICD-10-CM

## 2019-06-25 NOTE — PROGRESS NOTES
Chief Complaint   Patient presents with    Injection     PPD placement     Tuberculin skin test applied to left ventral forearm. 0.1 mL intradermally. Explained how to read the test, measuring induration not just erythema; she will come into office if test appears positive. Instructed patient to return in 2 days for reading.

## 2019-06-27 ENCOUNTER — CLINICAL SUPPORT (OUTPATIENT)
Dept: INTERNAL MEDICINE CLINIC | Age: 59
End: 2019-06-27

## 2019-06-27 DIAGNOSIS — Z11.1 SCREENING-PULMONARY TB: Primary | ICD-10-CM

## 2019-06-27 LAB
MM INDURATION POC: 0 MM (ref 0–5)
PPD POC: NEGATIVE NEGATIVE

## 2019-09-13 ENCOUNTER — HOSPITAL ENCOUNTER (EMERGENCY)
Age: 59
Discharge: HOME | End: 2019-09-13
Attending: EMERGENCY MEDICINE
Payer: OTHER GOVERNMENT

## 2019-09-13 VITALS
TEMPERATURE: 97.5 F | HEART RATE: 63 BPM | SYSTOLIC BLOOD PRESSURE: 101 MMHG | HEIGHT: 62 IN | DIASTOLIC BLOOD PRESSURE: 77 MMHG | WEIGHT: 132 LBS | BODY MASS INDEX: 24.29 KG/M2 | OXYGEN SATURATION: 100 % | RESPIRATION RATE: 18 BRPM

## 2019-09-13 DIAGNOSIS — L23.7 ALLERGIC DERMATITIS DUE TO POISON IVY: Primary | ICD-10-CM

## 2019-09-13 PROCEDURE — 99283 EMERGENCY DEPT VISIT LOW MDM: CPT

## 2019-09-13 PROCEDURE — 74011636637 HC RX REV CODE- 636/637: Performed by: EMERGENCY MEDICINE

## 2019-09-13 RX ORDER — PREDNISONE 20 MG/1
TABLET ORAL
Qty: 5 TAB | Refills: 0 | Status: SHIPPED | OUTPATIENT
Start: 2019-09-13

## 2019-09-13 RX ORDER — PREDNISONE 20 MG/1
60 TABLET ORAL
Status: COMPLETED | OUTPATIENT
Start: 2019-09-13 | End: 2019-09-13

## 2019-09-13 RX ADMIN — PREDNISONE 60 MG: 20 TABLET ORAL at 08:47

## 2019-09-13 NOTE — ED PROVIDER NOTES
HPI   The patient complains of a pruritic rash on her trunk and extremities and face for the last 7 days. She says she thinks she got into some poison ivy while working in the yard. She says she has a past history of having poison ivy problems in her yard. She has been using over-the-counter lotions and creams with no significant relief. No other complaints given at this time.   Past Medical History:   Diagnosis Date    Arthritis     right hip    Cancer ([address] 12/20/2012    Left Breast    Headache     Thyroid disease     hypothyroid       Past Surgical History:   Procedure Laterality Date    HX APPENDECTOMY  2016    HX BREAST LUMPECTOMY  2013 2013    HX CARPAL TUNNEL RELEASE  2014    right    HX CYST REMOVAL  1986    behind left knee    HX HEENT  1986    bilateral tubes    HX ORTHOPAEDIC      carpal tunnel right    HX WISDOM TEETH EXTRACTION      x4         Family History:   Problem Relation Age of Onset    Cancer Mother         colon,liver    Hypertension Mother     Heart Disease Father     Heart Attack Father     No Known Problems Brother     No Known Problems Maternal Grandmother     No Known Problems Maternal Grandfather     No Known Problems Paternal Grandmother     No Known Problems Paternal Grandfather     No Known Problems Son     Diabetes Daughter     Thyroid Disease Daughter        Social History     Socioeconomic History    Marital status:      Spouse name: Not on file    Number of children: Not on file    Years of education: Not on file    Highest education level: Not on file   Occupational History    Not on file   Social Needs    Financial resource strain: Not on file    Food insecurity:     Worry: Not on file     Inability: Not on file    Transportation needs:     Medical: Not on file     Non-medical: Not on file   Tobacco Use    Smoking status: Never Smoker    Smokeless tobacco: Never Used   Substance and Sexual Activity    Alcohol use: Yes     Comment: rare    Drug use: Not on file    Sexual activity: Not on file   Lifestyle    Physical activity:     Days per week: Not on file     Minutes per session: Not on file    Stress: Not on file   Relationships    Social connections:     Talks on phone: Not on file     Gets together: Not on file     Attends Confucianism service: Not on file     Active member of club or organization: Not on file     Attends meetings of clubs or organizations: Not on file     Relationship status: Not on file    Intimate partner violence:     Fear of current or ex partner: Not on file     Emotionally abused: Not on file     Physically abused: Not on file     Forced sexual activity: Not on file   Other Topics Concern    Not on file   Social History Narrative    Not on file         ALLERGIES: Mobic [meloxicam]; Other medication; Pcn [penicillins]; Penicillin g; Septra [sulfamethoprim]; and Sulfa (sulfonamide antibiotics)    Review of Systems   Constitutional: Negative. HENT: Negative. Respiratory: Negative. Cardiovascular: Negative. Gastrointestinal: Negative. Genitourinary: Negative. Musculoskeletal: Negative. Skin: Positive for rash. Neurological: Negative. Psychiatric/Behavioral: Negative. Vitals:    09/13/19 0808   BP: 101/77   Pulse: 63   Resp: 18   Temp: 97.5 °F (36.4 °C)   SpO2: 100%   Weight: 59.9 kg (132 lb)   Height: 5' 2\" (1.575 m)            Physical Exam   Constitutional: She is oriented to person, place, and time. She appears well-developed. HENT:   Head: Normocephalic and atraumatic. Mouth/Throat: Oropharynx is clear and moist.   Eyes: Pupils are equal, round, and reactive to light. Conjunctivae and EOM are normal.   Neck: Normal range of motion. Neck supple. Cardiovascular: Normal rate and regular rhythm. Pulmonary/Chest: Effort normal and breath sounds normal.   Musculoskeletal: Normal range of motion. Neurological: She is alert and oriented to person, place, and time.    Skin: Skin is warm and dry. Rash noted. There are pruritic linear papules along the left forearm left cheek and lower abdomen bilaterally with mild erythema and some eschar formation. Linear nature of the papules is consistent with poison ivy. Psychiatric: She has a normal mood and affect. Nursing note and vitals reviewed. MDM       Procedures    Patient understands and agrees with the disposition and follow-up plan.   Joselin Ugarte MD 8:30 AM

## 2019-09-13 NOTE — ED TRIAGE NOTES
Pt presents with rash x 2 weeks, states started on left arm has since spread to right arm, inner thighs, face and abdomin. Pt denies pain stats just a very bad itchy sensation. Pt states was doing yard work about 2 weeks ago but has not been outside since. Pt denies any recent changes to medication, soaps/detergents, does state has multiple food related allergies.

## 2019-09-23 RX ORDER — LEVOTHYROXINE SODIUM 100 UG/1
100 TABLET ORAL
Qty: 90 TAB | Refills: 1 | Status: SHIPPED | OUTPATIENT
Start: 2019-09-23 | End: 2020-03-16 | Stop reason: SDUPTHER

## 2019-09-23 NOTE — TELEPHONE ENCOUNTER
Last Visit: 1/24/19 with MICHELINE Jones  Next Appointment: 12/10/19 with MD Cassie Aviles  Previous Refill Encounter(s): 10/3/18 #90 with 3 refills    Requested Prescriptions     Pending Prescriptions Disp Refills    levothyroxine (SYNTHROID) 100 mcg tablet 90 Tab 0     Sig: Take 1 Tab by mouth Daily (before breakfast).

## 2020-03-16 RX ORDER — LEVOTHYROXINE SODIUM 100 UG/1
100 TABLET ORAL
Qty: 30 TAB | Refills: 0 | Status: SHIPPED | OUTPATIENT
Start: 2020-03-16

## 2020-03-16 NOTE — TELEPHONE ENCOUNTER
Please contact patient for scheduling. Thanks      Last visit 12/05/2018 MD Urban   Next appointment None   Previous refill encounter(s) 09/23/2019 Synthroid #90 with 1 refill     Requested Prescriptions     Pending Prescriptions Disp Refills    levothyroxine (SYNTHROID) 100 mcg tablet 30 Tab 0     Sig: Take 1 Tab by mouth Daily (before breakfast).

## 2020-03-16 NOTE — TELEPHONE ENCOUNTER
No more refills will be granted without an apt. Please schedule for a f/u apt for medication refills.     Dr. Maren Ledesma  [facility], [address] [place] [address], [address] [place] [address].  Phone: [phone]  Fax: [phone]

## 2020-03-20 NOTE — TELEPHONE ENCOUNTER
Pt says she has already found another provider closer to her home. This rx will last until she sees that doctor.

## 2022-03-19 PROBLEM — K90.0 CELIAC DISEASE: Status: ACTIVE | Noted: 2017-10-11

## 2022-03-19 PROBLEM — E03.9 ACQUIRED HYPOTHYROIDISM: Status: ACTIVE | Noted: 2017-10-11

## 2022-03-20 PROBLEM — C50.912 MALIGNANT NEOPLASM OF LEFT FEMALE BREAST (HCC): Status: ACTIVE | Noted: 2017-10-11

## 2022-10-17 NOTE — PROGRESS NOTES
-LMTCB on VM with pt's greeting.    -Pt is scheduled 10/18/22 @ 3pm for procedure. I am asking if pt would like to come instead @ 10:30am for 11am procedure.  -Outcome pending. Chief Complaint   Patient presents with   [address]    Hypothyroidism    Medication Refill     Printed for 90 days 3 refills     Patient was given a copy of the Advanced Directive form and understands to bring it in once completed. 1. Have you been to the ER, urgent care clinic since your last visit? Hospitalized since your last visit? No    2. Have you seen or consulted any other health care providers outside of the [address] [place] [address] since your last visit? Include any pap smears or colon screening.  No

## 2023-03-03 ENCOUNTER — HOSPITAL ENCOUNTER (OUTPATIENT)
Facility: HOSPITAL | Age: 63
Discharge: HOME | End: 2023-03-03
Payer: OTHER GOVERNMENT

## 2023-03-03 DIAGNOSIS — S32.9XXA CLOSED NONDISPLACED FRACTURE OF PELVIS, UNSPECIFIED PART OF PELVIS, INITIAL ENCOUNTER (HCC): ICD-10-CM

## 2023-03-03 PROCEDURE — 72195 MRI PELVIS W/O DYE: CPT

## 2023-03-07 ENCOUNTER — HOSPITAL ENCOUNTER (OUTPATIENT)
Facility: HOSPITAL | Age: 63
Discharge: HOME | End: 2023-03-10
Payer: COMMERCIAL

## 2023-03-07 DIAGNOSIS — Z13.820 SCREENING FOR OSTEOPOROSIS: ICD-10-CM

## 2023-03-07 DIAGNOSIS — Z12.31 ENCOUNTER FOR SCREENING MAMMOGRAM FOR BREAST CANCER: ICD-10-CM

## 2023-03-07 PROCEDURE — 77080 DXA BONE DENSITY AXIAL: CPT

## 2023-03-07 PROCEDURE — 77063 BREAST TOMOSYNTHESIS BI: CPT

## 2023-03-11 ENCOUNTER — APPOINTMENT (OUTPATIENT)
Facility: HOSPITAL | Age: 63
End: 2023-03-11
Payer: COMMERCIAL

## 2023-03-11 ENCOUNTER — HOSPITAL ENCOUNTER (EMERGENCY)
Facility: HOSPITAL | Age: 63
Discharge: HOME | End: 2023-03-11
Attending: EMERGENCY MEDICINE
Payer: COMMERCIAL

## 2023-03-11 VITALS
HEART RATE: 73 BPM | HEIGHT: 62 IN | OXYGEN SATURATION: 98 % | SYSTOLIC BLOOD PRESSURE: 129 MMHG | DIASTOLIC BLOOD PRESSURE: 51 MMHG | BODY MASS INDEX: 24.84 KG/M2 | RESPIRATION RATE: 18 BRPM | TEMPERATURE: 98.9 F | WEIGHT: 135 LBS

## 2023-03-11 DIAGNOSIS — S70.01XA CONTUSION OF RIGHT HIP, INITIAL ENCOUNTER: Primary | ICD-10-CM

## 2023-03-11 PROCEDURE — 99283 EMERGENCY DEPT VISIT LOW MDM: CPT | Performed by: EMERGENCY MEDICINE

## 2023-03-11 PROCEDURE — 73502 X-RAY EXAM HIP UNI 2-3 VIEWS: CPT

## 2023-03-11 RX ORDER — LEVOTHYROXINE SODIUM 0.1 MG/1
TABLET ORAL
COMMUNITY
Start: 2020-03-16

## 2023-03-11 RX ORDER — ONDANSETRON 4 MG/1
TABLET, ORALLY DISINTEGRATING ORAL
COMMUNITY
Start: 2022-05-09

## 2023-03-11 RX ORDER — SENNOSIDES 8.6 MG
CAPSULE ORAL EVERY 6 HOURS PRN
COMMUNITY

## 2023-03-11 RX ORDER — DICYCLOMINE HYDROCHLORIDE 10 MG/1
10 CAPSULE ORAL
COMMUNITY
Start: 2018-07-29

## 2023-03-11 ASSESSMENT — PAIN DESCRIPTION - FREQUENCY: FREQUENCY: CONTINUOUS

## 2023-03-11 ASSESSMENT — PAIN - FUNCTIONAL ASSESSMENT: PAIN_FUNCTIONAL_ASSESSMENT: 0-10

## 2023-03-11 ASSESSMENT — LIFESTYLE VARIABLES
HOW OFTEN DO YOU HAVE A DRINK CONTAINING ALCOHOL: NEVER
HOW MANY STANDARD DRINKS CONTAINING ALCOHOL DO YOU HAVE ON A TYPICAL DAY: PATIENT DOES NOT DRINK

## 2023-03-11 ASSESSMENT — PAIN SCALES - GENERAL: PAINLEVEL_OUTOF10: 9

## 2023-03-11 ASSESSMENT — PAIN DESCRIPTION - ORIENTATION: ORIENTATION: RIGHT

## 2023-03-11 ASSESSMENT — PAIN DESCRIPTION - PAIN TYPE: TYPE: ACUTE PAIN

## 2023-03-11 ASSESSMENT — PAIN DESCRIPTION - LOCATION: LOCATION: HIP

## 2023-03-11 ASSESSMENT — PAIN DESCRIPTION - DESCRIPTORS: DESCRIPTORS: ACHING;SHARP

## 2023-03-11 NOTE — ED TRIAGE NOTES
Pt c/o right hip pain since yesterday. Pt stated \"that she was skating yesterday when she tripped and fell on her back\". Pt el hitting her head.

## 2023-03-11 NOTE — ED NOTES
I have introduced myself to the patient and discussed anticipated plan of care. Patient resting quietly on stretcher in low and locked position. Call bell in reach. Side rail up x1. Patient c/o pain to right anterior hip. No pain with palpation to lumbar spine. No pain with palpation to right lateral hip joint region. Neuro: awake, alert. Resp: regular, unlabored  Skin: warm, dry and normal in appearance.       Kane Monte RN  03/11/23 6194

## 2023-03-11 NOTE — ED NOTES
Patient discharged at this time. This RN reviewed discharge instructions at this time with the patient. patient verbalized understanding and does not have any questions. Pt ambulatory upon discharge, & in stable condition. Pt armband removed & shredded.       Tommy Esquivel RN  03/11/23 2347

## 2023-03-11 NOTE — ED PROVIDER NOTES
EMERGENCY DEPARTMENT HISTORY AND PHYSICAL EXAM    2:20 PM EST seen at this time in room QA        Date: 3/11/2023  Patient Name: Maude Redman    History of Presenting Illness     Chief Complaint   Patient presents with    Fall    Hip Pain     right         History Provided By: patient    Additional History (Context): Maude Redman is a 58 y.o. female presents with fell yesterday while rollerskating landing on her tailbone but finds today severe pain in the right hip with any movement, she is having to use a cane. She is able to bear weight pretty well. Pain is mostly with movement. James Robison PCP: Swati De Luna MD    Chief Complaint:   Duration:    Timing:    Location:   Quality:   Severity:   Modifying Factors:   Associated Symptoms:       No current facility-administered medications for this encounter.      Current Outpatient Medications   Medication Sig Dispense Refill    levothyroxine (SYNTHROID) 100 MCG tablet       ondansetron (ZOFRAN-ODT) 4 MG disintegrating tablet       dicyclomine (BENTYL) 10 MG capsule Take 10 mg by mouth 4 times daily (before meals and nightly)      potassium gluconate 550 mg tablet Take by mouth daily      vitamin D 25 MCG (1000 UT) CAPS Take by mouth daily      Biotin 5 MG TBDP Take by mouth every morning      acetaminophen (TYLENOL) 650 MG extended release tablet Take by mouth every 6 hours as needed         Past History     Past Medical History:  Past Medical History:   Diagnosis Date    Arthritis     right hip    Cancer ([facility] [address] 12/20/2012    Left Breast    Headache     Thyroid disease     hypothyroid       Past Surgical History:  Past Surgical History:   Procedure Laterality Date    APPENDECTOMY  2016    BREAST LUMPECTOMY  2013    2013    CARPAL TUNNEL RELEASE  2014    right    CYST REMOVAL  1986    behind left knee    HEENT  1986    bilateral tubes    ORTHOPEDIC SURGERY      carpal tunnel right    WISDOM TOOTH EXTRACTION      x4       Family History:  Family History   Problem Relation Age of Onset    No Known Problems Son     Diabetes Daughter     Thyroid Disease Daughter     No Known Problems Brother     Heart Attack Father     Heart Disease Father     Hypertension Mother     Cancer Mother         colon,liver    No Known Problems Paternal Grandfather     No Known Problems Maternal Grandmother     No Known Problems Maternal Grandfather     No Known Problems Paternal Grandmother        Social History:  Social History     Tobacco Use    Smoking status: Never    Smokeless tobacco: Never   Substance Use Topics    Alcohol use: Yes       Allergies: Allergies   Allergen Reactions    Meloxicam Other (See Comments)     Bloating and severe constipation    Other Rash     Septra     Penicillins Rash and Itching     Other reaction(s): Unknown    Sulfa Antibiotics Other (See Comments)    Sulfamethoxazole-Trimethoprim Rash and Nausea And Vomiting         Review of Systems     Review of Systems      Physical Exam       Patient Vitals for the past 12 hrs:   Temp Pulse Resp BP SpO2   03/11/23 1406 98.9 °F (37.2 °C) 73 18 (!) 129/51 98 %       IPVITALS  Patient Vitals for the past 24 hrs:   BP Temp Temp src Pulse Resp SpO2 Height Weight   03/11/23 1406 (!) 129/51 98.9 °F (37.2 °C) Oral 73 18 98 % 5' 2\" (1.575 m) 135 lb (61.2 kg)       Physical Exam  Constitutional:       General: She is in acute distress. Appearance: She is not toxic-appearing. Musculoskeletal:         General: Normal range of motion. Comments: No appreciable area of tenderness no contusion or swelling. She has a patch of some type she does not know what the active ingredient is, on the anterior aspect of the flexor crease of the hip. No shortening or internal rotation she is able to bear weight well. Neurological:      Mental Status: She is alert. Diagnostic Study Results   Labs -  No results found for this or any previous visit (from the past 24 hour(s)).     Radiologic Studies -   XR HIP 2-3 VW W PELVIS RIGHT (Results Pending)     [id number]    Medications ordered:   Medications - No data to display      Medical Decision Making   Initial Medical Decision Making and DDx:  Highly unlikely for fracture or dislocation do not suspect hemarthrosis or septic joint. More likely a soft tissue injury, pulled muscle, tendon, possibly avulsion fracture. It is possible to have pelvis fracture and be ambulatory  Reviewed films of the right hip no bony abnormality. No fracture of the prosthetic right hip or pelvic structures. 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 I reviewed the vital signs, available nursing notes, past medical history, past surgical history, family history and social history. Patient Vitals for the past 12 hrs:   Temp Pulse Resp BP SpO2   03/11/23 1406 98.9 °F (37.2 °C) 73 18 (!) 129/51 98 %       Vital Signs-Reviewed the patient's vital signs. Pulse Oximetry Analysis, Cardiac Monitor, 12 lead ekg:      Interpreted by the EP. Records Reviewed: Nursing notes reviewed (Time of Review: 2:49 PM)    Procedures:   Critical Care Time: 0  If critical care time is note it is exclusive of any separately billable procedures. Aspirin: (was aspirin given for stroke?)    Diagnosis     Clinical Impression:   1.  Contusion of right hip, initial encounter        Disposition: DISPOSITION Decision To Discharge 03/11/2023 02:49:00 PM       New for 2023:  DISCHARGE MEDICATIONS:  Current Discharge Medication List             Details   levothyroxine (SYNTHROID) 100 MCG tablet       ondansetron (ZOFRAN-ODT) 4 MG disintegrating tablet       dicyclomine (BENTYL) 10 MG capsule Take 10 mg by mouth 4 times daily (before meals and nightly)      potassium gluconate 550 mg tablet Take by mouth daily      vitamin D 25 MCG (1000 UT) CAPS Take by mouth daily      Biotin 5 MG TBDP Take by mouth every morning      acetaminophen (TYLENOL) 650 MG extended release tablet Take by mouth every 6 hours as needed             DISCONTINUED MEDICATIONS:  Current Discharge Medication List          PATIENT REFERRED TO:  Follow Up with:  Marcos Gomez MD  [place] [phone]    In 3 days    _______________________________    Notes:    Hector Mast MD using Dragon dictation      _______________________________     Sigrid Camacho MD  03/11/23 1451

## 2024-02-14 ENCOUNTER — TRANSCRIBE ORDERS (OUTPATIENT)
Facility: HOSPITAL | Age: 64
End: 2024-02-14

## 2024-02-14 DIAGNOSIS — Z12.31 VISIT FOR SCREENING MAMMOGRAM: Primary | ICD-10-CM

## 2024-03-08 ENCOUNTER — HOSPITAL ENCOUNTER (OUTPATIENT)
Facility: HOSPITAL | Age: 64
Discharge: HOME | End: 2024-03-08
Payer: OTHER GOVERNMENT

## 2024-03-08 DIAGNOSIS — Z12.31 VISIT FOR SCREENING MAMMOGRAM: ICD-10-CM

## 2024-03-08 PROCEDURE — 77063 BREAST TOMOSYNTHESIS BI: CPT

## 2024-11-30 ENCOUNTER — HOSPITAL ENCOUNTER (EMERGENCY)
Facility: HOSPITAL | Age: 64
Discharge: HOME | End: 2024-11-30
Attending: EMERGENCY MEDICINE
Payer: OTHER GOVERNMENT

## 2024-11-30 VITALS
BODY MASS INDEX: 24.84 KG/M2 | SYSTOLIC BLOOD PRESSURE: 132 MMHG | RESPIRATION RATE: 18 BRPM | OXYGEN SATURATION: 99 % | DIASTOLIC BLOOD PRESSURE: 70 MMHG | HEART RATE: 72 BPM | TEMPERATURE: 98.3 F | WEIGHT: 135 LBS | HEIGHT: 62 IN

## 2024-11-30 DIAGNOSIS — N39.0 URINARY TRACT INFECTION WITHOUT HEMATURIA, SITE UNSPECIFIED: Primary | ICD-10-CM

## 2024-11-30 LAB
APPEARANCE UR: CLEAR
BACTERIA URNS QL MICRO: ABNORMAL /HPF
BILIRUB UR QL: ABNORMAL
COLOR UR: ABNORMAL
EPITH CASTS URNS QL MICRO: ABNORMAL /LPF (ref 0–5)
GLUCOSE UR STRIP.AUTO-MCNC: NEGATIVE MG/DL
HGB UR QL STRIP: ABNORMAL
KETONES UR QL STRIP.AUTO: NEGATIVE MG/DL
LEUKOCYTE ESTERASE UR QL STRIP.AUTO: ABNORMAL
NITRITE UR QL STRIP.AUTO: POSITIVE
PH UR STRIP: 7 (ref 5–8)
PROT UR STRIP-MCNC: 30 MG/DL
RBC #/AREA URNS HPF: ABNORMAL /HPF (ref 0–5)
SP GR UR REFRACTOMETRY: 1.01 (ref 1–1.03)
UROBILINOGEN UR QL STRIP.AUTO: 1 EU/DL (ref 0.2–1)
WBC URNS QL MICRO: ABNORMAL /HPF (ref 0–4)

## 2024-11-30 PROCEDURE — 6370000000 HC RX 637 (ALT 250 FOR IP): Performed by: EMERGENCY MEDICINE

## 2024-11-30 PROCEDURE — 81001 URINALYSIS AUTO W/SCOPE: CPT

## 2024-11-30 PROCEDURE — 99283 EMERGENCY DEPT VISIT LOW MDM: CPT

## 2024-11-30 RX ORDER — PHENAZOPYRIDINE HYDROCHLORIDE 100 MG/1
200 TABLET, FILM COATED ORAL
Status: COMPLETED | OUTPATIENT
Start: 2024-11-30 | End: 2024-11-30

## 2024-11-30 RX ORDER — NITROFURANTOIN MACROCRYSTALS 50 MG/1
50 CAPSULE ORAL EVERY 6 HOURS SCHEDULED
Status: DISCONTINUED | OUTPATIENT
Start: 2024-12-01 | End: 2024-11-30

## 2024-11-30 RX ORDER — PHENAZOPYRIDINE HYDROCHLORIDE 100 MG/1
200 TABLET, FILM COATED ORAL
Status: DISCONTINUED | OUTPATIENT
Start: 2024-11-30 | End: 2024-11-30 | Stop reason: HOSPADM

## 2024-11-30 RX ORDER — NITROFURANTOIN MACROCRYSTALS 50 MG/1
50 CAPSULE ORAL 4 TIMES DAILY
Qty: 28 CAPSULE | Refills: 0 | Status: SHIPPED | OUTPATIENT
Start: 2024-11-30 | End: 2024-11-30

## 2024-11-30 RX ORDER — NITROFURANTOIN 25; 75 MG/1; MG/1
100 CAPSULE ORAL EVERY 12 HOURS SCHEDULED
Status: DISCONTINUED | OUTPATIENT
Start: 2024-11-30 | End: 2024-11-30 | Stop reason: HOSPADM

## 2024-11-30 RX ORDER — NITROFURANTOIN MACROCRYSTALS 50 MG/1
100 CAPSULE ORAL 4 TIMES DAILY
Qty: 28 CAPSULE | Refills: 0 | Status: SHIPPED | OUTPATIENT
Start: 2024-11-30 | End: 2024-12-03

## 2024-11-30 RX ORDER — PHENAZOPYRIDINE HYDROCHLORIDE 100 MG/1
100 TABLET, FILM COATED ORAL 3 TIMES DAILY PRN
Qty: 6 TABLET | Refills: 0 | Status: SHIPPED | OUTPATIENT
Start: 2024-11-30 | End: 2024-12-03

## 2024-11-30 RX ADMIN — PHENAZOPYRIDINE 200 MG: 100 TABLET ORAL at 21:24

## 2024-11-30 RX ADMIN — NITROFURANTOIN MONOHYDRATE/MACROCRYSTALS 100 MG: 75; 25 CAPSULE ORAL at 21:24

## 2024-11-30 ASSESSMENT — LIFESTYLE VARIABLES
HOW MANY STANDARD DRINKS CONTAINING ALCOHOL DO YOU HAVE ON A TYPICAL DAY: PATIENT DOES NOT DRINK
HOW OFTEN DO YOU HAVE A DRINK CONTAINING ALCOHOL: NEVER

## 2024-11-30 ASSESSMENT — ENCOUNTER SYMPTOMS
GASTROINTESTINAL NEGATIVE: 1
RESPIRATORY NEGATIVE: 1

## 2024-11-30 ASSESSMENT — PAIN - FUNCTIONAL ASSESSMENT: PAIN_FUNCTIONAL_ASSESSMENT: NONE - DENIES PAIN

## 2024-12-01 NOTE — ED PROVIDER NOTES
recognitionprogram.  Efforts were made to edit the dictations but occasionally words are mis-transcribed.)    Eagle Damico MD(electronically signed)  Attending Emergency Physician          Eagle Damico MD  12/01/24 0356

## 2024-12-01 NOTE — ED TRIAGE NOTES
Patient A/O x 4, presented to the ED with complaint of lower abdominal pressure with urination, urinary frequency x 3 days.

## 2024-12-03 RX ORDER — NITROFURANTOIN 25; 75 MG/1; MG/1
100 CAPSULE ORAL 2 TIMES DAILY
Qty: 10 CAPSULE | Refills: 0 | Status: SHIPPED | OUTPATIENT
Start: 2024-12-03 | End: 2024-12-08

## 2025-02-13 ENCOUNTER — TRANSCRIBE ORDERS (OUTPATIENT)
Facility: HOSPITAL | Age: 65
End: 2025-02-13

## 2025-02-13 DIAGNOSIS — Z12.31 ENCOUNTER FOR SCREENING MAMMOGRAM FOR MALIGNANT NEOPLASM OF BREAST: Primary | ICD-10-CM

## 2025-02-24 ENCOUNTER — TRANSCRIBE ORDERS (OUTPATIENT)
Facility: HOSPITAL | Age: 65
End: 2025-02-24

## 2025-02-24 DIAGNOSIS — Z12.31 OTHER SCREENING MAMMOGRAM: Primary | ICD-10-CM

## 2025-03-17 ENCOUNTER — HOSPITAL ENCOUNTER (OUTPATIENT)
Facility: HOSPITAL | Age: 65
Setting detail: RECURRING SERIES
Discharge: HOME | End: 2025-03-20
Payer: OTHER GOVERNMENT

## 2025-03-17 PROCEDURE — 97535 SELF CARE MNGMENT TRAINING: CPT

## 2025-03-17 PROCEDURE — 97110 THERAPEUTIC EXERCISES: CPT

## 2025-03-17 PROCEDURE — 97162 PT EVAL MOD COMPLEX 30 MIN: CPT

## 2025-03-17 NOTE — PROGRESS NOTES
[facility]  [address] [place] [address] Ph:[phone] Fx: [phone]    PLAN OF CARE/ Statement of Necessity for Physical Therapy Services           Patient name: Davida Champion Start of Care: 3/17/2025   Referral source: Brittany Dotson MD : 1960    Medical Diagnosis: Cervicalgia Onset Date: 3/1/2020   Treatment Diagnosis: M54.2, G89.29  CHRONIC NECK PAIN                                     Prior Hospitalization: see medical history Provider#: [id number]     Comorbidities: thyroid; breast CA with radiation 12 years ago; OA    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    Assessment / proctor information:  64 y.o. year old female presents with CC of chronic neck pain that began 5 years ago and has progressively worsened over the years. Impairments noted today: decreased C/S AROM in  ext, B lateral flexion, and B rotation; decreased B middle and lower trap strength; multiple trigger points noted in B upper quadrants.  Patient will benefit from physical therapy to address deficits, and ultimately to return patient to prior level of function.      Evaluation Complexity:  History:  MEDIUM  Complexity : 1-2 comorbidities / personal factors will impact the outcome/ POC ; Examination:  MEDIUM Complexity : 3 Standardized tests and measures addressin body structure, function, activity limitation and / or participation in recreation  ;Presentation:  MEDIUM Complexity : Evolving with changing characteristics  ;Clinical Decision Making:  Oswestry Disability Index (SATINDER) for Low Back Pain = 46 % ; (> 41% Severe Disability) = HIGH Complexity FOTO score = an established functional score where 100 = no disability  Overall Complexity Rating: MEDIUM      Problem List: pain affecting function, decrease ROM, 
4:30 PM Abdirizak Platt, PTA [facility]   4/10/2025  4:30 PM Brittany Todd, PT [facility]   4/14/2025 11:10 AM Kristin Dougherty, PT [facility]   4/16/2025 11:10 AM [facility]

## 2025-03-19 ENCOUNTER — HOSPITAL ENCOUNTER (OUTPATIENT)
Facility: HOSPITAL | Age: 65
Setting detail: RECURRING SERIES
Discharge: HOME | End: 2025-03-22
Payer: OTHER GOVERNMENT

## 2025-03-19 PROCEDURE — 97530 THERAPEUTIC ACTIVITIES: CPT

## 2025-03-19 PROCEDURE — 97110 THERAPEUTIC EXERCISES: CPT

## 2025-03-19 PROCEDURE — 97140 MANUAL THERAPY 1/> REGIONS: CPT

## 2025-03-19 NOTE — PROGRESS NOTES
PHYSICAL / OCCUPATIONAL THERAPY - DAILY TREATMENT NOTE     Patient Name: Davida KULKARNI Quast    Date: 3/19/2025    : 1960  Insurance: Payor:  EAST / Plan:  EAST SELECT / Product Type: *No Product type* /      Patient  verified Yes     Visit #   Current / Total 2 24   Time   In / Out 11:14 11:52   Pain   In / Out 5 5   Subjective Functional Status/Changes: No new changes reported   Changes to:  Allergies, Med Hx, Sx Hx?   no       TREATMENT AREA =  Cervicalgia [M54.2]  Other chronic pain [G89.29]    If an interpreting service is utilized for treatment of this patient, the contents of this document represent the material reviewed with the patient via the .     OBJECTIVE      Therapeutic Procedures:  Tx Min Billable or 1:1 Min (if diff from Tx Min) Procedure, Rationale, Specifics   20  23674 Therapeutic Exercise (timed):  increase ROM, strength, coordination, balance, and proprioception to improve patient's ability to progress to PLOF and address remaining functional goals. (see flow sheet as applicable)    Details if applicable:       10  44136 Therapeutic Activity (timed):  use of dynamic activities replicating functional movements to increase ROM, strength, coordination, balance, and proprioception in order to improve patient's ability to progress to PLOF and address remaining functional goals.  (see flow sheet as applicable)    Details if applicable:     8  78295 Manual Therapy (timed):  decrease pain, increase ROM, and increase tissue extensibility to improve patient's ability to progress to PLOF and address remaining functional goals.  The manual therapy interventions were performed at a separate and distinct time from the therapeutic activities interventions . Details: Gentle T/S mobs gradel-ll- pt prone  , SOR , UT stretch and STM of UT- Pt supine    Details if applicable:           Details if applicable:            Details if applicable:     38  [facility] Totals Reminder: bill using

## 2025-03-25 ENCOUNTER — HOSPITAL ENCOUNTER (OUTPATIENT)
Facility: HOSPITAL | Age: 65
Setting detail: RECURRING SERIES
Discharge: HOME | End: 2025-03-28
Payer: OTHER GOVERNMENT

## 2025-03-25 ENCOUNTER — HOSPITAL ENCOUNTER (OUTPATIENT)
Facility: HOSPITAL | Age: 65
Discharge: HOME | End: 2025-03-28
Payer: OTHER GOVERNMENT

## 2025-03-25 VITALS — WEIGHT: 134 LBS | BODY MASS INDEX: 24.66 KG/M2 | HEIGHT: 62 IN

## 2025-03-25 DIAGNOSIS — Z12.31 OTHER SCREENING MAMMOGRAM: ICD-10-CM

## 2025-03-25 PROCEDURE — 97140 MANUAL THERAPY 1/> REGIONS: CPT

## 2025-03-25 PROCEDURE — 97112 NEUROMUSCULAR REEDUCATION: CPT

## 2025-03-25 PROCEDURE — 97110 THERAPEUTIC EXERCISES: CPT

## 2025-03-25 PROCEDURE — 77063 BREAST TOMOSYNTHESIS BI: CPT

## 2025-03-25 NOTE — PROGRESS NOTES
Brittany Todd, PT [facility]   4/14/2025 11:10 AM Kristin Dougherty, PT [facility]   4/16/2025 11:10 AM Kristin Dougherty, PT [facility]

## 2025-03-27 ENCOUNTER — HOSPITAL ENCOUNTER (OUTPATIENT)
Facility: HOSPITAL | Age: 65
Setting detail: RECURRING SERIES
Discharge: HOME | End: 2025-03-30
Payer: OTHER GOVERNMENT

## 2025-03-27 PROCEDURE — 97112 NEUROMUSCULAR REEDUCATION: CPT

## 2025-03-27 PROCEDURE — 97110 THERAPEUTIC EXERCISES: CPT

## 2025-03-27 PROCEDURE — 97140 MANUAL THERAPY 1/> REGIONS: CPT

## 2025-03-27 NOTE — PROGRESS NOTES
PHYSICAL / OCCUPATIONAL THERAPY - DAILY TREATMENT NOTE     Patient Name: Davida KULKARNI Quast    Date: 3/27/2025    : 1960  Insurance: Payor:  EAST / Plan:  EAST SELECT / Product Type: *No Product type* /      Patient  verified Yes     Visit #   Current / Total 4 24   Time   In / Out 432 510   Pain   In / Out 3 2   Subjective Functional Status/Changes: Pt states she's had trouble fitting HEP in secondary to busy schedule.    Changes to:  Allergies, Med Hx, Sx Hx?   no       TREATMENT AREA =  Cervicalgia [M54.2]  Other chronic pain [G89.29]    If an interpreting service is utilized for treatment of this patient, the contents of this document represent the material reviewed with the patient via the .     OBJECTIVE    Therapeutic Procedures:  Tx Min Billable or 1:1 Min (if diff from Tx Min) Procedure, Rationale, Specifics   16  52701 Manual Therapy (timed):  decrease pain, increase ROM, increase tissue extensibility, and decrease trigger points to improve patient's ability to progress to PLOF and address remaining functional goals.  The manual therapy interventions were performed at a separate and distinct time from the therapeutic activities interventions . Details: DTM c/s paraspinals, SCM, scalene, UT, LS. Lateral glide C3-C6 GI-IV, c/s distraction     Details if applicable:       14  22790 Therapeutic Exercise (timed):  increase ROM, strength, coordination, balance, and proprioception to improve patient's ability to progress to PLOF and address remaining functional goals. (see flow sheet as applicable)    Details if applicable:     8  08053 Neuromuscular Re-Education (timed):  improve balance, coordination, kinesthetic sense, posture, core stability and proprioception to improve patient's ability to develop conscious control of individual muscles and awareness of position of extremities in order to progress to PLOF and address remaining functional goals. (see flow sheet as applicable)

## 2025-03-31 ENCOUNTER — HOSPITAL ENCOUNTER (OUTPATIENT)
Facility: HOSPITAL | Age: 65
Setting detail: RECURRING SERIES
Discharge: HOME | End: 2025-04-03
Payer: OTHER GOVERNMENT

## 2025-03-31 PROCEDURE — 97530 THERAPEUTIC ACTIVITIES: CPT

## 2025-03-31 PROCEDURE — 97112 NEUROMUSCULAR REEDUCATION: CPT

## 2025-03-31 PROCEDURE — 97140 MANUAL THERAPY 1/> REGIONS: CPT

## 2025-03-31 PROCEDURE — 97110 THERAPEUTIC EXERCISES: CPT

## 2025-03-31 NOTE — PROGRESS NOTES
PHYSICAL / OCCUPATIONAL THERAPY - DAILY TREATMENT NOTE     Patient Name: Davida Zhouast    Date: 3/31/2025    : 1960  Insurance: Payor:  EAST / Plan:  EAST SELECT / Product Type: *No Product type* /      Patient  verified Yes     Visit #   Current / Total 5 24   Time   In / Out 1150 1230   Pain   In / Out 2 3   Subjective Functional Status/Changes: Pt reports no significant pain over the weekend, states she's been able to work on her neck stretches throughout the day.    Changes to:  Allergies, Med Hx, Sx Hx?   no       TREATMENT AREA =  Cervicalgia [M54.2]  Other chronic pain [G89.29]    If an interpreting service is utilized for treatment of this patient, the contents of this document represent the material reviewed with the patient via the .     Therapeutic Procedures:  Tx Min Billable or 1:1 Min (if diff from Tx Min) Procedure, Rationale, Specifics   8  99768 Therapeutic Exercise (timed):  increase ROM, strength, coordination, balance, and proprioception to improve patient's ability to progress to PLOF and address remaining functional goals. (see flow sheet as applicable)    Details if applicable:       10  39564 Neuromuscular Re-Education (timed):  improve balance, coordination, kinesthetic sense, posture, core stability and proprioception to improve patient's ability to develop conscious control of individual muscles and awareness of position of extremities in order to progress to PLOF and address remaining functional goals. (see flow sheet as applicable)    Details if applicable:     14  92306 Manual Therapy (timed):  decrease pain, increase ROM, increase tissue extensibility, and decrease trigger points to improve patient's ability to progress to PLOF and address remaining functional goals.  The manual therapy interventions were performed at a separate and distinct time from the therapeutic activities interventions . Details: DTM c/s paraspinals, SCM, scalene, UT, LS.

## 2025-04-02 ENCOUNTER — APPOINTMENT (OUTPATIENT)
Facility: HOSPITAL | Age: 65
End: 2025-04-02
Payer: OTHER GOVERNMENT

## 2025-04-03 ENCOUNTER — HOSPITAL ENCOUNTER (OUTPATIENT)
Facility: HOSPITAL | Age: 65
Setting detail: RECURRING SERIES
Discharge: HOME | End: 2025-04-06
Payer: OTHER GOVERNMENT

## 2025-04-03 PROCEDURE — 97110 THERAPEUTIC EXERCISES: CPT

## 2025-04-03 PROCEDURE — 97140 MANUAL THERAPY 1/> REGIONS: CPT

## 2025-04-03 PROCEDURE — 97112 NEUROMUSCULAR REEDUCATION: CPT

## 2025-04-03 NOTE — PROGRESS NOTES
PHYSICAL / OCCUPATIONAL THERAPY - DAILY TREATMENT NOTE     Patient Name: Davida Champion    Date: 4/3/2025    : 1960  Insurance: Payor:  EAST / Plan:  EAST SELECT / Product Type: *No Product type* /      Patient  verified Yes     Visit #   Current / Total 6 24   Time   In / Out 11:14 12:00   Pain   In / Out 5/10 4/10   Subjective Functional Status/Changes: Pt reports having some increased soreness today.   Changes to:  Allergies, Med Hx, Sx Hx?   no       TREATMENT AREA =  Cervicalgia [M54.2]  Other chronic pain [G89.29]    If an interpreting service is utilized for treatment of this patient, the contents of this document represent the material reviewed with the patient via the .     OBJECTIVE    Modalities Rationale:     decrease pain and increase tissue extensibility to improve patient's ability to progress to PLOF and address remaining functional goals.      10   min  unbilled []  Ice     [x]  Heat    location/position: C/s, Pt supine with wedge.   Skin assessment post-treatment (if applicable):    []  intact    []  redness- no adverse reaction                 []redness - adverse reaction:         Therapeutic Procedures:  Tx Min Billable or 1:1 Min (if diff from Tx Min) Procedure, Rationale, Specifics   20  48700 Therapeutic Exercise (timed):  increase ROM, strength, coordination, balance, and proprioception to improve patient's ability to progress to PLOF and address remaining functional goals. (see flow sheet as applicable)    Details if applicable:       8  09082 Neuromuscular Re-Education (timed):  improve balance, coordination, kinesthetic sense, posture, core stability and proprioception to improve patient's ability to develop conscious control of individual muscles and awareness of position of extremities in order to progress to PLOF and address remaining functional goals. (see flow sheet as applicable)    Details if applicable:     8  35756 Manual Therapy (timed):

## 2025-04-07 ENCOUNTER — HOSPITAL ENCOUNTER (OUTPATIENT)
Facility: HOSPITAL | Age: 65
Setting detail: RECURRING SERIES
Discharge: HOME | End: 2025-04-10
Payer: OTHER GOVERNMENT

## 2025-04-07 PROCEDURE — 97530 THERAPEUTIC ACTIVITIES: CPT

## 2025-04-07 PROCEDURE — 97112 NEUROMUSCULAR REEDUCATION: CPT

## 2025-04-07 PROCEDURE — 97110 THERAPEUTIC EXERCISES: CPT

## 2025-04-07 NOTE — PROGRESS NOTES
report,03/19/2025  2. Increase C/S  B lateral flexion to 25 deg to  increase ease with ADLs.  IE:right 15; left 20  Long Term Goals: To be accomplished in 12 weeks  1.Decrease Oswestry to 16% to indicate improvement in functional mobility.  IE:46%  2. Increase B middle trap strength to 4+/5 to improve stability for daily activities.  IE:B 3+/5  Current 4/5 B 4/3/2025.  3. Increase B lower trap strength to 4-/5 to improve stability for daily tasks.  IE:3/5 B  Current added weight to prone letters series to progress strength. 4/7/2025.   4. Patient will report >=50% improvement to increase ease with sleeping.  IE:0%  5. Increase B C/S rotation to 60 deg to increase ease with driving.  IE:right 52; left 45 deg  Current 3/31/25\" Right Rotation: 68 Left Rotation: 60    PLAN  Yes  Continue plan of care  []  Upgrade activities as tolerated  []  Discharge due to :  []  Other:    Abdirizak Platt PTA    4/7/2025    10:37 AM    Future Appointments   Date Time Provider Department Center   4/10/2025 10:30 AM Brittany Todd, PT [facility]   4/21/2025  1:10 PM Kristin Dougherty, PT [facility]   4/23/2025 11:50 AM Kristin Dougherty, PT [facility]

## 2025-04-10 ENCOUNTER — HOSPITAL ENCOUNTER (OUTPATIENT)
Facility: HOSPITAL | Age: 65
Setting detail: RECURRING SERIES
End: 2025-04-10
Payer: OTHER GOVERNMENT

## 2025-04-14 ENCOUNTER — APPOINTMENT (OUTPATIENT)
Facility: HOSPITAL | Age: 65
End: 2025-04-14
Payer: OTHER GOVERNMENT

## 2025-04-16 ENCOUNTER — APPOINTMENT (OUTPATIENT)
Facility: HOSPITAL | Age: 65
End: 2025-04-16
Payer: OTHER GOVERNMENT

## 2025-04-18 ENCOUNTER — HOSPITAL ENCOUNTER (OUTPATIENT)
Facility: HOSPITAL | Age: 65
Setting detail: RECURRING SERIES
Discharge: HOME | End: 2025-04-21
Payer: OTHER GOVERNMENT

## 2025-04-18 PROCEDURE — 97140 MANUAL THERAPY 1/> REGIONS: CPT

## 2025-04-18 PROCEDURE — 97530 THERAPEUTIC ACTIVITIES: CPT

## 2025-04-18 PROCEDURE — 97110 THERAPEUTIC EXERCISES: CPT

## 2025-04-18 NOTE — PROGRESS NOTES
PHYSICAL / OCCUPATIONAL THERAPY - DAILY TREATMENT NOTE     Patient Name: Davida Champion    Date: 2025    : 1960  Insurance: Payor:  EAST / Plan:  EAST SELECT / Product Type: *No Product type* /      Patient  verified Yes     Visit #   Current / Total 8 24   Time   In / Out 11:13 12:03   Pain   In / Out 3 0   Subjective Functional Status/Changes: No new changes   Changes to:  Allergies, Med Hx, Sx Hx?   no       TREATMENT AREA =  Cervicalgia [M54.2]  Other chronic pain [G89.29]    If an interpreting service is utilized for treatment of this patient, the contents of this document represent the material reviewed with the patient via the .     OBJECTIVE    Modalities Rationale:     decrease pain and increase tissue extensibility to improve patient's ability to progress to PLOF and address remaining functional goals.     min [] Estim Unattended, type/location:                                      []  w/ice    []  w/heat    min [] Estim Attended, type/location:                                     []  w/US     []  w/ice    []  w/heat    []  TENS insruct      min []  Mechanical Traction: type/lbs                   []  pro   []  sup   []  int   []  cont    []  before manual    []  after manual    min []  Ultrasound, settings/location:      min []  Iontophoresis w/ dexamethasone, location:                                               []  take home patch       []  in clinic     10   min  unbilled []  Ice     [x]  Heat    location/position: C/S- supine    min []  Paraffin,  details:     min []  Vasopneumatic Device, press/temp:     min []  Whirlpool / Fluido:    If using vaso (only need to measure limb vaso being performed on)      pre-treatment girth :       post-treatment girth :       measured at (landmark location) :      min []  Other:    Skin assessment post-treatment (if applicable):    []  intact    []  redness- no adverse reaction                 []redness - adverse reaction:

## 2025-04-18 NOTE — PROGRESS NOTES
[facility]  [address] [place] [address] - Ph: [phone]   Fx: [phone]    PHYSICAL THERAPY PROGRESS NOTE      Patient name: Davida Champion Start of Care: 3/17/2025    Referral source: Brittany Dotson MD : 1960    Medical Diagnosis: Cervicalgia  Other chronic pain  Payor:  EAST / Plan:  EAST SELECT / Product Type: *No Product type* /  Onset Date:3/1/2020     Treatment Diagnosis:  M54.2, G89.29  CHRONIC NECK PAIN    Prior Hospitalization: see medical history Provider#: [id number]   Comorbidities: thyroid; breast CA with radiation 12 years ago; OA   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 I and compliant with HEP for self management of symptoms.   IE:issued HEP  PN: 2025: Non-compliance per pt's report   2. Increase C/S  B lateral flexion to 25 deg to  increase ease with ADLs.  IE:right 15; left 20  PN: 2025:Met, Left LF 30 deg, R 40 deg  Long Term Goals: To be accomplished in 12 weeks  1.Decrease Oswestry to 16% to indicate improvement in functional mobility.  IE:46%  PN: 2025: Met, Oswestry score 15.55%  2. Increase B middle trap strength to 4+/5 to improve stability for daily activities.  IE:B 3+/5  PN: 2025: Progressed, middle trap strength 4/5  3. Increase B lower trap strength to 4-/5 to improve stability for daily tasks.  IE:3/5 B  PN: 2025: Progressed, B lower trap strength 3+/5  Current added weight to prone letters series to progress strength. 2025.   4. Patient will report >=50% improvement to increase ease with sleeping.  IE:0%  PN: 2025: Progressed, pt reported 30% improvement in her symptoms  5. Increase B

## 2025-04-21 ENCOUNTER — TELEPHONE (OUTPATIENT)
Facility: HOSPITAL | Age: 65
End: 2025-04-21

## 2025-04-21 ENCOUNTER — APPOINTMENT (OUTPATIENT)
Facility: HOSPITAL | Age: 65
End: 2025-04-21
Payer: OTHER GOVERNMENT

## 2025-04-23 ENCOUNTER — HOSPITAL ENCOUNTER (OUTPATIENT)
Facility: HOSPITAL | Age: 65
Setting detail: RECURRING SERIES
End: 2025-04-23
Payer: OTHER GOVERNMENT

## 2025-04-23 ENCOUNTER — TELEPHONE (OUTPATIENT)
Facility: HOSPITAL | Age: 65
End: 2025-04-23

## 2025-04-25 ENCOUNTER — HOSPITAL ENCOUNTER (OUTPATIENT)
Facility: HOSPITAL | Age: 65
Setting detail: RECURRING SERIES
Discharge: HOME | End: 2025-04-28
Payer: OTHER GOVERNMENT

## 2025-04-25 PROCEDURE — 97110 THERAPEUTIC EXERCISES: CPT

## 2025-04-25 PROCEDURE — 97140 MANUAL THERAPY 1/> REGIONS: CPT

## 2025-04-25 NOTE — PROGRESS NOTES
strength to 4-/5 to improve stability for daily tasks.  IE:3/5 B  PN: 04/18/2025: Progressed, B lower trap strength 3+/5  Current added weight to prone letters series to progress strength. 4/7/2025.   4. Patient will report >=50% improvement to increase ease with sleeping.  IE:0%  PN: 04/18/2025: Progressed, pt reported 30% improvement in her symptoms  5. Increase B C/S rotation to 60 deg to increase ease with driving.  IE:right 52; left 45 deg  PN: 04/18/2025: Progressed, B rot AROM 55 deg  Summary of Care/ Key Functional Changes: Pt has made significant progress towards her goals.Still demonstrates strength and AROM deficits. Educated about the benefits of being compliant with HEP to maximize the therapeutic benefit. Pt will be benefited from continued PT sessions to reach her PLOF and abilities with less difficulty and pain.    PLAN  Yes  Continue plan of care  []  Upgrade activities as tolerated  []  Discharge due to :  []  Other:    Mya Salas, PT    4/25/2025    10:01 AM    No future appointments.

## 2025-04-30 ENCOUNTER — APPOINTMENT (OUTPATIENT)
Facility: HOSPITAL | Age: 65
End: 2025-04-30
Payer: OTHER GOVERNMENT

## 2025-05-07 ENCOUNTER — TELEPHONE (OUTPATIENT)
Facility: HOSPITAL | Age: 65
End: 2025-05-07

## 2025-05-15 ENCOUNTER — TELEPHONE (OUTPATIENT)
Facility: HOSPITAL | Age: 65
End: 2025-05-15

## 2025-05-19 ENCOUNTER — HOSPITAL ENCOUNTER (OUTPATIENT)
Facility: HOSPITAL | Age: 65
Setting detail: RECURRING SERIES
Discharge: HOME | End: 2025-05-22
Payer: OTHER GOVERNMENT

## 2025-05-19 PROCEDURE — 97530 THERAPEUTIC ACTIVITIES: CPT

## 2025-05-19 PROCEDURE — 97140 MANUAL THERAPY 1/> REGIONS: CPT

## 2025-05-19 PROCEDURE — 97535 SELF CARE MNGMENT TRAINING: CPT

## 2025-05-19 NOTE — PROGRESS NOTES
[facility] [address]  [address]  [place] [address]  [phone] fax  PROGRESS NOTE  Patient Name: Davida Champion : 1960   Treatment/Medical Diagnosis: Cervicalgia [M54.2]  Other chronic pain [G89.29]   Referral Source:  Payor Brittany Dotson MD  Payor:  EAST / Plan:  EAST SELECT / Product Type: *No Product type* /      Date of Initial Visit: 3/17/25 Attended Visits: 10 Missed Visits: 0     SUMMARY OF TREATMENT  Pt self-reports 70% improvement since beginning PT with good progress. Pt reports functional gains that include: Reports that pain is manageable (ice use only occasionally); sleep has gotten better since starting therapy. Reports improved with C/S mobility. Headaches are less frequent since starting therapy. Pt reports functional deficits that include: While mobility has improved still having tightness into all planes of C/S mobility; difficulty with holding UE overheard for prolonged periods. Pt reports a recent average pain of 3-4/10, 7/10 at worst, and 0/10 at best. Objectively pt is progressing well, demonstrates increased C/S mobility in lateral flexion & rotation. Not quite full rotation but greatly improved since starting therapy. She continues to demonstrate posterior trunk weakness into middle trap & lower trap. Reports of decreasing frequency with HA's since starting therapy. Pt would benefit from continued skilled PT to address remaining functional deficits. We will continue with PT at 1-2x/wk for 5 weeks.     CURRENT STATUS    Short Term Goals: To be accomplished in 2 weeks     1. I and compliant with HEP for self management of symptoms.   PN: 2025: Non-compliance per pt's report   PN: not met, Reports non-compliance with HEP due to busy family life.      2. Increase C/S  B lateral flexion to 25 deg to  increase ease with ADLs.  PN: 2025:Met, Left LF 30 deg, R 40 deg  PN: 2025: Met, L LF 30 deg, 
Reassessment goal,      8 8 [id number] Manual Therapy (timed):  decrease pain, increase ROM, and increase tissue extensibility to improve patient's ability to progress to PLOF and address remaining functional goals.  The manual therapy interventions were performed at a separate and distinct time from the therapeutic activities interventions . (see flow sheet as applicable)     Details if applicable:  STM c/s paraspinals, SOR coupled with PROM neck flexion stretch for splenius   13 13 [id number] Self Care/Home Management (timed):  improve patient knowledge and understanding of home injury/symptom/pain management, positioning, home safety, and activity modification  to improve patient's ability to progress to PLOF and address remaining functional goals.  (see flow sheet as applicable)     Details if applicable:  Pt education   41 41 [facility] Totals Reminder: bill using total billable min of TIMED therapeutic procedures (example: do not include dry needle or estim unattended, both untimed codes, in totals to left)  8-22 min = 1 unit; 23-37 min = 2 units; 38-52 min = 3 units; 53-67 min = 4 units; 68-82 min = 5 units   Total Total     Charge Capture    [x]  Patient Education billed concurrently with other procedures   [x] Review HEP    [] Progressed/Changed HEP, detail:    [] Other detail:       Objective Information/Functional Measures/Assessment    Pt self-reports 70% improvement since beginning PT with good progress. Pt reports functional gains that include: Reports that pain is manageable (ice use only occasionally); sleep has gotten better since starting therapy. Reports improved with C/S mobility. Headaches are less frequent since starting therapy. Pt reports functional deficits that include: While mobility has improved still having tightness into all planes of C/S mobility; difficulty with holding UE overheard for prolonged periods. Pt reports a recent average pain of 3-4/10, 7/10 at worst, and 0/10 at best. Objectively pt is

## 2025-06-03 ENCOUNTER — HOSPITAL ENCOUNTER (OUTPATIENT)
Facility: HOSPITAL | Age: 65
Setting detail: RECURRING SERIES
Discharge: HOME | End: 2025-06-06
Payer: OTHER GOVERNMENT

## 2025-06-03 PROCEDURE — 97140 MANUAL THERAPY 1/> REGIONS: CPT

## 2025-06-03 PROCEDURE — 97535 SELF CARE MNGMENT TRAINING: CPT

## 2025-06-03 PROCEDURE — 97112 NEUROMUSCULAR REEDUCATION: CPT

## 2025-06-03 PROCEDURE — 97110 THERAPEUTIC EXERCISES: CPT

## 2025-06-03 NOTE — PROGRESS NOTES
PHYSICAL / OCCUPATIONAL THERAPY - DAILY TREATMENT NOTE    Patient Name: Davida KULKARNI Quast    Date: 6/3/2025    : 1960  Insurance: Payor:  EAST / Plan:  EAST SELECT / Product Type: *No Product type* /      Patient  verified Yes     Visit #   Current / Total 1 10   Time   In / Out 1211 1250   Pain   In / Out 5 4   Subjective Functional Status/Changes: \"My neck hurts today and I have a headache.\"     TREATMENT AREA =  Cervicalgia  Other chronic pain    OBJECTIVE         Therapeutic Procedures:    Tx Min Billable or 1:1 Min (if diff from Tx Min) Procedure, Rationale, Specifics   10  07814 Therapeutic Exercise (timed):  increase ROM, strength, coordination, balance, and proprioception to improve patient's ability to progress to PLOF and address remaining functional goals. (see flow sheet as applicable)     Details if applicable:       10  87893 Manual Therapy (timed):  decrease pain, increase ROM, increase tissue extensibility, decrease trigger points, and increase postural awareness to improve patient's ability to progress to PLOF and address remaining functional goals.  The manual therapy interventions were performed at a separate and distinct time from the therapeutic activities interventions . (see flow sheet as applicable)     Details if applicable:  SOR, STM to c/s paraspinals and bilateral UT/LS   10  52499 Neuromuscular Re-Education (timed):  improve balance, coordination, kinesthetic sense, posture, core stability and proprioception to improve patient's ability to develop conscious control of individual muscles and awareness of position of extremities in order to progress to PLOF and address remaining functional goals. (see flow sheet as applicable)     Details if applicable:     9  68048 Self Care/Home Management (timed):  improve patient knowledge and understanding of home injury/symptom/pain management, positioning, posture/ergonomics, home safety, activity modification, transfer

## 2025-06-04 ENCOUNTER — APPOINTMENT (OUTPATIENT)
Facility: HOSPITAL | Age: 65
End: 2025-06-04
Payer: OTHER GOVERNMENT

## 2025-06-05 ENCOUNTER — HOSPITAL ENCOUNTER (OUTPATIENT)
Facility: HOSPITAL | Age: 65
Setting detail: RECURRING SERIES
Discharge: HOME | End: 2025-06-08
Payer: OTHER GOVERNMENT

## 2025-06-05 PROCEDURE — 97530 THERAPEUTIC ACTIVITIES: CPT

## 2025-06-05 PROCEDURE — 97112 NEUROMUSCULAR REEDUCATION: CPT

## 2025-06-05 PROCEDURE — 97110 THERAPEUTIC EXERCISES: CPT

## 2025-06-05 PROCEDURE — 97140 MANUAL THERAPY 1/> REGIONS: CPT

## 2025-06-05 NOTE — PROGRESS NOTES
material reviewed with the patient via the .     Future Appointments   Date Time Provider Department Center   6/10/2025  2:50 PM Rosalina Gan PTA [facility]   6/12/2025  2:50 PM Rosalina Gan PTA [facility]   6/16/2025  2:10 PM Lanre Cobb, NAIF [facility]

## 2025-06-10 ENCOUNTER — HOSPITAL ENCOUNTER (OUTPATIENT)
Facility: HOSPITAL | Age: 65
Setting detail: RECURRING SERIES
Discharge: HOME | End: 2025-06-13
Payer: OTHER GOVERNMENT

## 2025-06-10 PROCEDURE — 97112 NEUROMUSCULAR REEDUCATION: CPT

## 2025-06-10 PROCEDURE — 97535 SELF CARE MNGMENT TRAINING: CPT

## 2025-06-10 PROCEDURE — 97530 THERAPEUTIC ACTIVITIES: CPT

## 2025-06-10 PROCEDURE — 97110 THERAPEUTIC EXERCISES: CPT

## 2025-06-10 NOTE — PROGRESS NOTES
PHYSICAL / OCCUPATIONAL THERAPY - DAILY TREATMENT NOTE    Patient Name: Davida Champion    Date: 6/10/2025    : 1960  Insurance: Payor:  EAST / Plan: [employer]AST [employer] / Product Type: *No Product type* /      Patient  verified Yes     Visit #   Current / Total 3 10   Time   In / Out 250 342   Pain   In / Out 0 0   Subjective Functional Status/Changes: Patient reports no pain today but reports taking tylenol due to pain earlier today.     TREATMENT AREA =  Cervicalgia  Other chronic pain    OBJECTIVE    Modalities Rationale:     decrease pain and increase tissue extensibility to improve patient's ability to progress to PLOF and address remaining functional goals.    10 min  unbill []  Ice     [x]  Heat    location/position: Supine with MHP to C/S and LE elevated on wedge   Skin assessment post-treatment:   Intact      Therapeutic Procedures:    Tx Min Billable or 1:1 Min (if diff from Tx Min) Procedure, Rationale, Specifics   15 15 [id number] Therapeutic Exercise (timed):  increase ROM, strength, coordination, balance, and proprioception to improve patient's ability to progress to PLOF and address remaining functional goals. (see flow sheet as applicable)     Details if applicable:       10 10 49851 Neuromuscular Re-Education (timed):  improve balance, coordination, kinesthetic sense, posture, core stability and proprioception to improve patient's ability to develop conscious control of individual muscles and awareness of position of extremities in order to progress to PLOF and address remaining functional goals. (see flow sheet as applicable)     Details if applicable:     9  14803 Therapeutic Activity (timed):  use of dynamic activities replicating functional movements to increase ROM, strength, coordination, balance, and proprioception in order to improve patient's ability to progress to PLOF and address remaining functional goals.  (see flow sheet as applicable)     Details if applicable:     8 8

## 2025-06-12 ENCOUNTER — HOSPITAL ENCOUNTER (OUTPATIENT)
Facility: HOSPITAL | Age: 65
Setting detail: RECURRING SERIES
Discharge: HOME | End: 2025-06-15
Payer: OTHER GOVERNMENT

## 2025-06-12 PROCEDURE — 97112 NEUROMUSCULAR REEDUCATION: CPT

## 2025-06-12 PROCEDURE — 97535 SELF CARE MNGMENT TRAINING: CPT

## 2025-06-12 PROCEDURE — 97110 THERAPEUTIC EXERCISES: CPT

## 2025-06-12 NOTE — PROGRESS NOTES
PHYSICAL / OCCUPATIONAL THERAPY - DAILY TREATMENT NOTE    Patient Name: Davida Champion    Date: 2025    : 1960  Insurance: Payor:  EAST / Plan: [email or web address] EAST SELECT / Product Type: *No Product type* /      Patient  verified Yes     Visit #   Current / Total 4 10   Time   In / Out 253 344   Pain   In / Out 3 2   Subjective Functional Status/Changes: Patient reports some pain on (R) side of neck.     TREATMENT AREA =  Cervicalgia  Other chronic pain    OBJECTIVE    Modalities Rationale:     decrease pain and increase tissue extensibility to improve patient's ability to progress to PLOF and address remaining functional goals.    10 min  unbill []  Ice     [x]  Heat    location/position: Supine with MHP to C/S and LE elevated on wedge.   Skin assessment post-treatment:   Intact      Therapeutic Procedures:    Tx Min Billable or 1:1 Min (if diff from Tx Min) Procedure, Rationale, Specifics   23  48111 Therapeutic Exercise (timed):  increase ROM, strength, coordination, balance, and proprioception to improve patient's ability to progress to PLOF and address remaining functional goals. (see flow sheet as applicable)     Details if applicable:       10 10 99454 Neuromuscular Re-Education (timed):  improve balance, coordination, kinesthetic sense, posture, core stability and proprioception to improve patient's ability to develop conscious control of individual muscles and awareness of position of extremities in order to progress to PLOF and address remaining functional goals. (see flow sheet as applicable)     Details if applicable:     8  08142 Self Care/Home Management (timed):  improve patient knowledge and understanding of home injury/symptom/pain management, positioning, posture/ergonomics, activity modification, and joint protection strategies  to improve patient's ability to progress to PLOF and address remaining functional goals.  (see flow sheet as applicable)     Details if applicable:

## 2025-06-16 ENCOUNTER — HOSPITAL ENCOUNTER (OUTPATIENT)
Facility: HOSPITAL | Age: 65
Setting detail: RECURRING SERIES
Discharge: HOME | End: 2025-06-19
Payer: OTHER GOVERNMENT

## 2025-06-16 PROCEDURE — 97110 THERAPEUTIC EXERCISES: CPT

## 2025-06-16 PROCEDURE — 97535 SELF CARE MNGMENT TRAINING: CPT

## 2025-06-16 PROCEDURE — 97112 NEUROMUSCULAR REEDUCATION: CPT

## 2025-06-16 PROCEDURE — 97530 THERAPEUTIC ACTIVITIES: CPT

## 2025-06-16 NOTE — PROGRESS NOTES
[facility] - [facility] [address]  [address]  [place] [address]  [phone] fax    DISCHARGE SUMMARY  Patient Name: Davida Champion : 1960   Treatment/Medical Diagnosis: Cervicalgia [M54.2]  Other chronic pain [G89.29]   Referral Source: Brittany Dotson MD     Date of Initial Visit: 2025 Attended Visits: 15 Missed Visits: 1     SUMMARY OF TREATMENT   has attended 15 visits consistently and made steady progress with skilled physical therapy services.  At time of last visit, Pt reported the following:  Functional Gains - exercises, household duties, c/s ROM; Functional Deficits - getting in a comfortable position, headaches, pain, sleeping, household duties, carrying for mother in law; and 50% improvement since start of care. Pt reports a recent average pain of 4/10, 8/10 at worst, and 0/10 at best. Pt reports that she is able to self-manage her care at this time as she is busy caring for her mother in law. Educated pt on importance of completing her HEP daily to maximize the benefits of PT to help with remaining deficits. Prior to flare up of increased pain today pt had seen a reduction in pain and improvement in function. Pt has met or is progressing towards all goals and is compliant with comprehensive HEP.  Pt is appropriate for D/C at this time to continue to manage care independently and maintain long term gains made with skilled therapy.      CURRENT STATUS  1. I and compliant with HEP for self management of symptoms.   PN: not met, Reports non-compliance with HEP due to busy family life.   NOT MET; reports non-compliance with HEP     2.Decrease Oswestry to 16% to indicate improvement in functional mobility.  PN: progressing, NDI = 24%  Updated goal: NDI to at least 12%               NOT MET; regression to 66% - likely due to arriving today with flare up     3. Increase B middle trap strength to 4+/5 to improve stability for daily

## 2025-06-16 NOTE — PROGRESS NOTES
PHYSICAL / OCCUPATIONAL THERAPY - DAILY TREATMENT NOTE    Patient Name: Davida Champion    Date: 2025    : 1960  Insurance: Payor:  EAST / Plan:  [employer]AST SELECT / Product Type: *No Product type* /      Patient  verified Yes     Visit #   Current / Total 5 10   Time   In / Out 214 304   Pain   In / Out 7 3   Subjective Functional Status/Changes: \"I've been hurting since Friday. It feels like when I first started.\"     TREATMENT AREA =  Cervicalgia  Other chronic pain    OBJECTIVE      Heat (UNBILLED):  location/position: neck in supine .    Min Rationale   10 decrease pain to improve patient's ability to progress to PLOF and address remaining functional goals.     Skin assessment post-treatment:   Intact       Therapeutic Procedures:    Tx Min Billable or 1:1 Min (if diff from Tx Min) Procedure, Rationale, Specifics   8  49616 Therapeutic Exercise (timed):  increase ROM, strength, coordination, balance, and proprioception to improve patient's ability to progress to PLOF and address remaining functional goals. (see flow sheet as applicable)     Details if applicable:       8  54204 Neuromuscular Re-Education (timed):  improve balance, coordination, kinesthetic sense, posture, core stability and proprioception to improve patient's ability to develop conscious control of individual muscles and awareness of position of extremities in order to progress to PLOF and address remaining functional goals. (see flow sheet as applicable)     Details if applicable:     14  54180 Therapeutic Activity (timed):  use of dynamic activities replicating functional movements to increase ROM, strength, coordination, balance, and proprioception in order to improve patient's ability to progress to PLOF and address remaining functional goals.  (see flow sheet as applicable)     Details if applicable:     10  69461 Self Care/Home Management (timed):  improve patient knowledge and understanding of home

## 2025-06-16 NOTE — PROGRESS NOTES
Physical Therapy Discharge Instructions      [facility] [address]  [address]  [place] [address]  [phone] fax      Patient: Davida Champion  : 1960      Continue Home Exercise Program 1-2 times per day      Continue with    [x] Ice  as needed      [x] Heat           Follow up with MD:     [] Upon completion of therapy     [x] As needed      Recommendations:     [x]   Return to activity with home program    []   Return to activity with the following modifications:       []Post Rehab Program    []Join Independent aquatic program     [x]Return to/join local gym    Lanre Cobb PTA, CSCS   2025 2:35 PM